# Patient Record
Sex: FEMALE | Race: WHITE | NOT HISPANIC OR LATINO | Employment: OTHER | URBAN - NONMETROPOLITAN AREA
[De-identification: names, ages, dates, MRNs, and addresses within clinical notes are randomized per-mention and may not be internally consistent; named-entity substitution may affect disease eponyms.]

---

## 2017-06-27 ENCOUNTER — OFFICE VISIT (OUTPATIENT)
Dept: OBSTETRICS AND GYNECOLOGY | Facility: CLINIC | Age: 47
End: 2017-06-27

## 2017-06-27 VITALS
WEIGHT: 167 LBS | HEIGHT: 61 IN | DIASTOLIC BLOOD PRESSURE: 74 MMHG | SYSTOLIC BLOOD PRESSURE: 136 MMHG | BODY MASS INDEX: 31.53 KG/M2

## 2017-06-27 DIAGNOSIS — Z01.419 ENCOUNTER FOR GYNECOLOGICAL EXAMINATION WITHOUT ABNORMAL FINDING: Primary | ICD-10-CM

## 2017-06-27 DIAGNOSIS — R68.82 LIBIDO, DECREASED: ICD-10-CM

## 2017-06-27 PROCEDURE — 99396 PREV VISIT EST AGE 40-64: CPT | Performed by: OBSTETRICS & GYNECOLOGY

## 2017-06-27 RX ORDER — FLUOXETINE HYDROCHLORIDE 40 MG/1
80 CAPSULE ORAL DAILY
COMMUNITY
Start: 2017-06-14

## 2017-06-27 RX ORDER — LEVOFLOXACIN 500 MG/1
TABLET, FILM COATED ORAL
COMMUNITY
Start: 2017-06-14 | End: 2017-06-27

## 2017-06-27 RX ORDER — PANTOPRAZOLE SODIUM 40 MG/1
40 TABLET, DELAYED RELEASE ORAL DAILY
COMMUNITY
Start: 2017-06-14

## 2017-06-27 RX ORDER — ATORVASTATIN CALCIUM 40 MG/1
TABLET, FILM COATED ORAL
COMMUNITY
Start: 2017-06-14 | End: 2020-11-11

## 2017-06-27 RX ORDER — BISACODYL 5 MG/1
5 TABLET, DELAYED RELEASE ORAL DAILY PRN
COMMUNITY
End: 2019-11-11

## 2017-06-27 NOTE — PATIENT INSTRUCTIONS
Preventive Care 40-64 Years, Female  Preventive care refers to lifestyle choices and visits with your health care provider that can promote health and wellness.  WHAT DOES PREVENTIVE CARE INCLUDE?  · A yearly physical exam. This is also called an annual well check.  · Dental exams once or twice a year.  · Routine eye exams. Ask your health care provider how often you should have your eyes checked.  · Personal lifestyle choices, including:    Daily care of your teeth and gums.    Regular physical activity.    Eating a healthy diet.    Avoiding tobacco and drug use.    Limiting alcohol use.    Practicing safe sex.    Taking low-dose aspirin daily starting at age 50.    Taking vitamin and mineral supplements as recommended by your health care provider.  WHAT HAPPENS DURING AN ANNUAL WELL CHECK?  The services and screenings done by your health care provider during your annual well check will depend on your age, overall health, lifestyle risk factors, and family history of disease.  Counseling  Your health care provider may ask you questions about your:  · Alcohol use.  · Tobacco use.  · Drug use.  · Emotional well-being.  · Home and relationship well-being.  · Sexual activity.  · Eating habits.  · Work and work environment.  · Method of birth control.  · Menstrual cycle.  · Pregnancy history.  Screening  You may have the following tests or measurements:  · Height, weight, and BMI.  · Blood pressure.  · Lipid and cholesterol levels. These may be checked every 5 years, or more frequently if you are over 50 years old.  · Skin check.  · Lung cancer screening. You may have this screening every year starting at age 55 if you have a 30-pack-year history of smoking and currently smoke or have quit within the past 15 years.  · Fecal occult blood test (FOBT) of the stool. You may have this test every year starting at age 50.  · Flexible sigmoidoscopy or colonoscopy. You may have a sigmoidoscopy every 5 years or a colonoscopy  every 10 years starting at age 50.  · Hepatitis C blood test.  · Hepatitis B blood test.  · Sexually transmitted disease (STD) testing.  · Diabetes screening. This is done by checking your blood sugar (glucose) after you have not eaten for a while (fasting). You may have this done every 1-3 years.  · Mammogram. This may be done every 1-2 years. Talk to your health care provider about when you should start having regular mammograms. This may depend on whether you have a family history of breast cancer.  · BRCA-related cancer screening. This may be done if you have a family history of breast, ovarian, tubal, or peritoneal cancers.  · Pelvic exam and Pap test. This may be done every 3 years starting at age 21. Starting at age 30, this may be done every 5 years if you have a Pap test in combination with an HPV test.  · Bone density scan. This is done to screen for osteoporosis. You may have this scan if you are at high risk for osteoporosis.  Discuss your test results, treatment options, and if necessary, the need for more tests with your health care provider.  Vaccines   Your health care provider may recommend certain vaccines, such as:  · Influenza vaccine. This is recommended every year.  · Tetanus, diphtheria, and acellular pertussis (Tdap, Td) vaccine. You may need a Td booster every 10 years.  · Varicella vaccine. You may need this if you have not been vaccinated.  · Zoster vaccine. You may need this after age 60.  · Measles, mumps, and rubella (MMR) vaccine. You may need at least one dose of MMR if you were born in 1957 or later. You may also need a second dose.  · Pneumococcal 13-valent conjugate (PCV13) vaccine. You may need this if you have certain conditions and were not previously vaccinated.  · Pneumococcal polysaccharide (PPSV23) vaccine. You may need one or two doses if you smoke cigarettes or if you have certain conditions.  · Meningococcal vaccine. You may need this if you have certain  conditions.  · Hepatitis A vaccine. You may need this if you have certain conditions or if you travel or work in places where you may be exposed to hepatitis A.  · Hepatitis B vaccine. You may need this if you have certain conditions or if you travel or work in places where you may be exposed to hepatitis B.  · Haemophilus influenzae type b (Hib) vaccine. You may need this if you have certain conditions.  Talk to your health care provider about which screenings and vaccines you need and how often you need them.     This information is not intended to replace advice given to you by your health care provider. Make sure you discuss any questions you have with your health care provider.     Document Released: 01/13/2017 Document Reviewed: 01/13/2017  Elsevier Interactive Patient Education ©2017 [employer]

## 2017-06-27 NOTE — PROGRESS NOTES
Subjective   Chief Complaint   Patient presents with   • Gynecologic Exam     wants to discuss options for decrease libido.      Lesly Nicholas is a 46 y.o. year old  presenting to be seen for her annual exam. Doing well, quit smoking. Ablatoin  no bleeding since then. MMG 6-8 months ago wNL. S/p BTL. Prozac for many years due to mood labaility    SEXUAL Hx:  She is currently sexually active.  In the past year there has not been new sexual partners.    Condoms are not typically used.  She would not like to be screened for STD's at today's exam.  Current birth control method: not using any form of contraception and does not wish to get pregnant.  MENSTRUAL Hx:  No LMP recorded. Patient has had an ablation.  In the past 6 months her cycles have been regular, predictable and occur monthly.   Her menstrual flow has been absent and flow is normal.   Each month on average there are roughly 0 days of very heavy flow.    Intermenstrual bleeding is absent.    Post-coital bleeding is absent.  Dysmenorrhea: none and is not affecting her activities of daily living  PMS: none and is not affecting her activities of daily living  Her cycles are not a source of concern for her that she wishes to discuss today.  HEALTH Hx:  She exercises regularly: no (and has no plans to become more active).  She wears her seat belt:yes.  She has concerns about domestic violence: no.  OTHER COMPLAINTS:  Nothing else    The following portions of the patient's history were reviewed and updated as appropriate:problem list, current medications, allergies, past family history, past medical history, past social history and past surgical history.    Smoking status: Former Smoker                                                              Packs/day: 0.00      Years: 0.00      Smokeless status: Never Used                        Review of Systems  Consitutional NEG: anorexia or night sweats    POS: nothing reported   Gastointestinal  "NEG: bloating, change in bowel habits, melena or reflux symptoms    POS: nothing reported   Genitourinary NEG: dysuria or hematuria    POS: nothing reported   Integument NEG: moles that are changing in size, shape, color or rashes    POS: nothing reported   Breast NEG: persistent breast lump, skin dimpling or nipple discharge    POS: nothing reported          Objective   /74  Ht 61\" (154.9 cm)  Wt 167 lb (75.8 kg)  BMI 31.55 kg/m2    General:  well developed; well nourished  no acute distress   Skin:  No suspicious lesions seen   Thyroid: normal to inspection and palpation   Breasts:  Examined in supine position  Symmetric without masses or skin dimpling  Nipples normal without inversion, lesions or discharge  There are no palpable axillary nodes   Abdomen: soft, non-tender; no masses  no umbilical or inginual hernias are present  no hepato-splenomegaly   Pelvis: Clinical staff was present for exam  External genitalia:  normal appearance of the external genitalia including Bartholin's and Anahola's glands.  :  urethral meatus normal; urethral hypermobility is absent.  Vaginal:  normal pink mucosa without prolapse or lesions.  Cervix:  normal appearance.  Uterus:  normal size, shape and consistency.  Adnexa:  normal bimanual exam of the adnexa.        Assessment   1. Normal PE  2. Decerase Libido--Test gel didn't work, takes prozac 80mg po qday     Plan   1. PAP done, rec Natural prgeterone cream.   2. Recdecreasing  Prozac  3.          This note was electronically signed.      June 27, 2017    "

## 2017-06-28 LAB
ESTRADIOL SERPL-MCNC: <5 PG/ML
FSH SERPL-ACNC: 23.5 MIU/ML

## 2017-07-06 DIAGNOSIS — Z01.419 ENCOUNTER FOR GYNECOLOGICAL EXAMINATION WITHOUT ABNORMAL FINDING: ICD-10-CM

## 2018-07-25 ENCOUNTER — APPOINTMENT (OUTPATIENT)
Dept: GENERAL RADIOLOGY | Facility: HOSPITAL | Age: 48
End: 2018-07-25

## 2018-07-25 ENCOUNTER — HOSPITAL ENCOUNTER (EMERGENCY)
Facility: HOSPITAL | Age: 48
Discharge: HOME | End: 2018-07-25
Attending: EMERGENCY MEDICINE | Admitting: EMERGENCY MEDICINE

## 2018-07-25 VITALS
DIASTOLIC BLOOD PRESSURE: 91 MMHG | HEART RATE: 73 BPM | HEIGHT: 61 IN | BODY MASS INDEX: 30.89 KG/M2 | RESPIRATION RATE: 18 BRPM | WEIGHT: 163.6 LBS | TEMPERATURE: 98.1 F | SYSTOLIC BLOOD PRESSURE: 135 MMHG | OXYGEN SATURATION: 97 %

## 2018-07-25 DIAGNOSIS — R00.2 HEART PALPITATIONS: Primary | ICD-10-CM

## 2018-07-25 LAB
ALBUMIN SERPL-MCNC: 4.1 G/DL (ref 3.5–5)
ALBUMIN/GLOB SERPL: 1.5 G/DL (ref 1–2)
ALP SERPL-CCNC: 41 U/L (ref 38–126)
ALT SERPL W P-5'-P-CCNC: 31 U/L (ref 13–69)
AMPHET+METHAMPHET UR QL: NEGATIVE
AMPHETAMINES UR QL: NEGATIVE
ANION GAP SERPL CALCULATED.3IONS-SCNC: 10.3 MMOL/L (ref 10–20)
AST SERPL-CCNC: 25 U/L (ref 15–46)
BARBITURATES UR QL SCN: NEGATIVE
BASOPHILS # BLD AUTO: 0.03 10*3/MM3 (ref 0–0.2)
BASOPHILS NFR BLD AUTO: 0.6 % (ref 0–2.5)
BENZODIAZ UR QL SCN: NEGATIVE
BILIRUB SERPL-MCNC: 0.2 MG/DL (ref 0.2–1.3)
BUN BLD-MCNC: 9 MG/DL (ref 7–20)
BUN/CREAT SERPL: 12.9 (ref 7.1–23.5)
BUPRENORPHINE SERPL-MCNC: NEGATIVE NG/ML
CALCIUM SPEC-SCNC: 9.4 MG/DL (ref 8.4–10.2)
CANNABINOIDS SERPL QL: NEGATIVE
CHLORIDE SERPL-SCNC: 105 MMOL/L (ref 98–107)
CO2 SERPL-SCNC: 29 MMOL/L (ref 26–30)
COCAINE UR QL: NEGATIVE
CREAT BLD-MCNC: 0.7 MG/DL (ref 0.6–1.3)
DEPRECATED RDW RBC AUTO: 45.2 FL (ref 37–54)
EOSINOPHIL # BLD AUTO: 0.12 10*3/MM3 (ref 0–0.7)
EOSINOPHIL NFR BLD AUTO: 2.6 % (ref 0–7)
ERYTHROCYTE [DISTWIDTH] IN BLOOD BY AUTOMATED COUNT: 13 % (ref 11.5–14.5)
GFR SERPL CREATININE-BSD FRML MDRD: 89 ML/MIN/1.73
GLOBULIN UR ELPH-MCNC: 2.7 GM/DL
GLUCOSE BLD-MCNC: 95 MG/DL (ref 74–98)
HCT VFR BLD AUTO: 34 % (ref 37–47)
HGB BLD-MCNC: 11.4 G/DL (ref 12–16)
IMM GRANULOCYTES # BLD: 0.01 10*3/MM3 (ref 0–0.06)
IMM GRANULOCYTES NFR BLD: 0.2 % (ref 0–0.6)
LYMPHOCYTES # BLD AUTO: 1.27 10*3/MM3 (ref 0.6–3.4)
LYMPHOCYTES NFR BLD AUTO: 27.3 % (ref 10–50)
MCH RBC QN AUTO: 31.7 PG (ref 27–31)
MCHC RBC AUTO-ENTMCNC: 33.5 G/DL (ref 30–37)
MCV RBC AUTO: 94.4 FL (ref 81–99)
METHADONE UR QL SCN: NEGATIVE
MONOCYTES # BLD AUTO: 0.37 10*3/MM3 (ref 0–0.9)
MONOCYTES NFR BLD AUTO: 8 % (ref 0–12)
NEUTROPHILS # BLD AUTO: 2.85 10*3/MM3 (ref 2–6.9)
NEUTROPHILS NFR BLD AUTO: 61.3 % (ref 37–80)
NRBC BLD MANUAL-RTO: 0 /100 WBC (ref 0–0)
OPIATES UR QL: NEGATIVE
OXYCODONE UR QL SCN: NEGATIVE
PCP UR QL SCN: NEGATIVE
PLATELET # BLD AUTO: 219 10*3/MM3 (ref 130–400)
PMV BLD AUTO: 9 FL (ref 6–12)
POTASSIUM BLD-SCNC: 4.3 MMOL/L (ref 3.5–5.1)
PROPOXYPH UR QL: NEGATIVE
PROT SERPL-MCNC: 6.8 G/DL (ref 6.3–8.2)
RBC # BLD AUTO: 3.6 10*6/MM3 (ref 4.2–5.4)
SODIUM BLD-SCNC: 140 MMOL/L (ref 137–145)
TRICYCLICS UR QL SCN: NEGATIVE
TROPONIN I SERPL-MCNC: <0.012 NG/ML (ref 0–0.03)
WBC NRBC COR # BLD: 4.65 10*3/MM3 (ref 4.8–10.8)

## 2018-07-25 PROCEDURE — 80053 COMPREHEN METABOLIC PANEL: CPT | Performed by: PHYSICIAN ASSISTANT

## 2018-07-25 PROCEDURE — 84484 ASSAY OF TROPONIN QUANT: CPT | Performed by: PHYSICIAN ASSISTANT

## 2018-07-25 PROCEDURE — 99283 EMERGENCY DEPT VISIT LOW MDM: CPT

## 2018-07-25 PROCEDURE — 93005 ELECTROCARDIOGRAM TRACING: CPT | Performed by: EMERGENCY MEDICINE

## 2018-07-25 PROCEDURE — 80306 DRUG TEST PRSMV INSTRMNT: CPT | Performed by: PHYSICIAN ASSISTANT

## 2018-07-25 PROCEDURE — 85025 COMPLETE CBC W/AUTO DIFF WBC: CPT | Performed by: PHYSICIAN ASSISTANT

## 2018-07-25 PROCEDURE — 71045 X-RAY EXAM CHEST 1 VIEW: CPT

## 2018-07-25 RX ORDER — HYDROCODONE BITARTRATE AND ACETAMINOPHEN 5; 325 MG/1; MG/1
1 TABLET ORAL ONCE
Status: DISCONTINUED | OUTPATIENT
Start: 2018-07-25 | End: 2018-07-25

## 2018-07-25 RX ORDER — MELOXICAM 15 MG/1
15 TABLET ORAL DAILY
COMMUNITY
End: 2021-05-05

## 2018-10-09 ENCOUNTER — CONSULT (OUTPATIENT)
Dept: CARDIOLOGY | Facility: CLINIC | Age: 48
End: 2018-10-09

## 2018-10-09 VITALS
HEIGHT: 61 IN | OXYGEN SATURATION: 98 % | BODY MASS INDEX: 30.4 KG/M2 | HEART RATE: 91 BPM | SYSTOLIC BLOOD PRESSURE: 116 MMHG | DIASTOLIC BLOOD PRESSURE: 76 MMHG | WEIGHT: 161 LBS

## 2018-10-09 DIAGNOSIS — R00.2 PALPITATIONS: Primary | ICD-10-CM

## 2018-10-09 PROCEDURE — 99204 OFFICE O/P NEW MOD 45 MIN: CPT | Performed by: INTERNAL MEDICINE

## 2018-10-09 RX ORDER — VALACYCLOVIR HYDROCHLORIDE 1 G/1
TABLET, FILM COATED ORAL
COMMUNITY
End: 2021-05-05

## 2018-10-09 RX ORDER — LISINOPRIL 10 MG/1
TABLET ORAL
COMMUNITY
Start: 2018-10-04 | End: 2020-11-11

## 2018-10-09 NOTE — PROGRESS NOTES
Subjective:     Encounter Date:10/09/2018      Patient ID: Lesly Nicholas is a 47 y.o. female.    Chief Complaint: Palpitations  HPI  This is a 47-year-old female patient who presents to cardiology clinic with a one year history of progressively worsening palpitations.  The patient indicates that she has been having daily palpitations that occur several times per day but generally last only one to 2 seconds.  This is described as a sense that her heart is beating irregularly.  There is no associated dizziness or syncope.  She cannot identify any precipitating aggravating or alleviating features.  She has a history of hypertension and elevated cholesterol but no history of diabetes.  She has no history of documented arrhythmia and has never had outpatient cardiac monitoring.  She has had a 12-lead electrocardiogram which was normal.  She has no personal history of congestive heart failure or cardiomyopathy.  Her family history is strongly positive for premature coronary disease with her father having a heart attack at the age of 36.  She has never had an ischemic evaluation.  She reports being moderately active and has no exertional symptoms.  There is no shortness of breath at rest or with activity.  There is no chest discomfort at rest or with activity.  There is no exertional chest arm neck jaw shoulder or back discomfort.  There is no orthopnea PND or lower extremity edema.  She stopped smoking 8 years ago and has never restarted.  The following portions of the patient's history were reviewed and updated as appropriate: allergies, current medications, past family history, past medical history, past social history, past surgical history and problem  Review of Systems   Constitution: Negative for chills, diaphoresis, fever, weakness, malaise/fatigue, weight gain and weight loss.   HENT: Negative for ear discharge, hearing loss, hoarse voice and nosebleeds.    Eyes: Negative for discharge, double  vision, pain and photophobia.   Cardiovascular: Positive for palpitations. Negative for chest pain, claudication, cyanosis, dyspnea on exertion, irregular heartbeat, leg swelling, near-syncope, orthopnea, paroxysmal nocturnal dyspnea and syncope.   Respiratory: Negative for cough, hemoptysis, shortness of breath, sputum production and wheezing.    Endocrine: Negative for cold intolerance, heat intolerance, polydipsia, polyphagia and polyuria.   Hematologic/Lymphatic: Negative for adenopathy and bleeding problem. Does not bruise/bleed easily.   Skin: Negative for color change, flushing, itching and rash.   Musculoskeletal: Negative for muscle cramps, muscle weakness, myalgias and stiffness.   Gastrointestinal: Negative for abdominal pain, diarrhea, hematemesis, hematochezia, nausea and vomiting.   Genitourinary: Negative for dysuria, frequency and nocturia.   Neurological: Negative for focal weakness, loss of balance, numbness, paresthesias and seizures.   Psychiatric/Behavioral: Negative for altered mental status, hallucinations and suicidal ideas.   Allergic/Immunologic: Negative for HIV exposure, hives and persistent infections.           Current Outpatient Prescriptions:   •  atorvastatin (LIPITOR) 40 MG tablet, , Disp: , Rfl:   •  bisacodyl (DULCOLAX) 5 MG EC tablet, Take 5 mg by mouth Daily As Needed for Constipation., Disp: , Rfl:   •  FLUoxetine (PROzac) 40 MG capsule, , Disp: , Rfl:   •  lisinopril (PRINIVIL,ZESTRIL) 10 MG tablet, , Disp: , Rfl:   •  lysine acetate 500 MG tablet tablet, Take  by mouth Daily., Disp: , Rfl:   •  meloxicam (MOBIC) 15 MG tablet, Take 15 mg by mouth Daily., Disp: , Rfl:   •  pantoprazole (PROTONIX) 40 MG EC tablet, , Disp: , Rfl:   •  Potassium Gluconate (SM POTASSIUM PO), Take  by mouth., Disp: , Rfl:   •  valACYclovir (VALTREX) 1000 MG tablet, valacyclovir 1 gram tablet, Disp: , Rfl:      Objective:     Physical Exam   Constitutional: She is oriented to person, place, and time.  "She appears well-developed and well-nourished. No distress.   HENT:   Head: Normocephalic and atraumatic.   Mouth/Throat: Oropharynx is clear and moist.   Eyes: Pupils are equal, round, and reactive to light. Conjunctivae and EOM are normal. No scleral icterus.   Neck: Normal range of motion. Neck supple. No JVD present. No tracheal deviation present. No thyromegaly present.   Cardiovascular: Normal rate, regular rhythm, S1 normal, S2 normal, normal heart sounds, intact distal pulses and normal pulses.  PMI is not displaced.  Exam reveals no gallop and no friction rub.    No murmur heard.  Pulmonary/Chest: Effort normal and breath sounds normal. No respiratory distress. She has no wheezes. She has no rales.   Abdominal: Soft. Bowel sounds are normal. She exhibits no distension and no mass. There is no tenderness. There is no rebound and no guarding.   Musculoskeletal: Normal range of motion. She exhibits no edema or deformity.   Neurological: She is alert and oriented to person, place, and time. She displays normal reflexes. No cranial nerve deficit. Coordination normal.   Skin: Skin is warm and dry. No rash noted. She is not diaphoretic. No erythema.   Psychiatric: She has a normal mood and affect. Her behavior is normal. Thought content normal.     Blood pressure 116/76, pulse 91, height 154.9 cm (60.98\"), weight 73 kg (161 lb), SpO2 98 %.   Lab Review:       Assessment:         1. Palpitations  Some of the patient's symptoms could be due to underlying arrhythmia and/or ectopy.  She has never worn an outpatient heart monitor.  - Adult Transthoracic Echo Complete W/ Cont if Necessary Per Protocol  - Treadmill Stress Test  - Holter Monitor - 72 Hour Up To 21 Days  Procedures     Plan:       I have recommended a treadmill exercise stress test as well as an echocardiogram.  I have recommended an outpatient Holter monitor.  No changes in her medication therapy have been made at today's visit.  Further recommendations " will be predicated on the results of her outpatient testing.  The importance of diet exercise and weight management have been discussed with the patient.  The patient has been congratulated on her smoking cessation and cautioned to never again resumed cigarette smoking.

## 2018-10-18 LAB
BH CV STRESS BP STAGE 1: NORMAL
BH CV STRESS BP STAGE 2: NORMAL
BH CV STRESS BP STAGE 3: NORMAL
BH CV STRESS DURATION MIN STAGE 1: 3
BH CV STRESS DURATION MIN STAGE 2: 3
BH CV STRESS DURATION MIN STAGE 3: 3
BH CV STRESS DURATION MIN STAGE 4: 3
BH CV STRESS DURATION SEC STAGE 1: 0
BH CV STRESS DURATION SEC STAGE 2: 0
BH CV STRESS DURATION SEC STAGE 3: 0
BH CV STRESS DURATION SEC STAGE 4: 0
BH CV STRESS GRADE STAGE 1: 10
BH CV STRESS GRADE STAGE 2: 12
BH CV STRESS GRADE STAGE 3: 14
BH CV STRESS GRADE STAGE 4: 16
BH CV STRESS HR STAGE 1: 112
BH CV STRESS HR STAGE 2: 129
BH CV STRESS HR STAGE 3: 139
BH CV STRESS HR STAGE 4: 151
BH CV STRESS METS STAGE 1: 5
BH CV STRESS METS STAGE 2: 7.5
BH CV STRESS METS STAGE 3: 10
BH CV STRESS METS STAGE 4: 13.5
BH CV STRESS O2 STAGE 1: 97
BH CV STRESS O2 STAGE 2: 97
BH CV STRESS O2 STAGE 3: 97
BH CV STRESS O2 STAGE 4: 97
BH CV STRESS PROTOCOL 1: NORMAL
BH CV STRESS RECOVERY BP: NORMAL MMHG
BH CV STRESS RECOVERY HR: 96 BPM
BH CV STRESS RECOVERY O2: 99 %
BH CV STRESS SPEED STAGE 1: 1.7
BH CV STRESS SPEED STAGE 2: 2.5
BH CV STRESS SPEED STAGE 3: 3.4
BH CV STRESS SPEED STAGE 4: 4.2
BH CV STRESS STAGE 1: 1
BH CV STRESS STAGE 2: 2
BH CV STRESS STAGE 3: 3
BH CV STRESS STAGE 4: 4
MAXIMAL PREDICTED HEART RATE: 172 BPM
PERCENT MAX PREDICTED HR: 87.79 %
STRESS BASELINE BP: NORMAL MMHG
STRESS BASELINE HR: 89 BPM
STRESS O2 SAT REST: 99 %
STRESS PERCENT HR: 103 %
STRESS POST ESTIMATED WORKLOAD: 10.1 METS
STRESS POST EXERCISE DUR MIN: 9 MIN
STRESS POST EXERCISE DUR SEC: 30 SEC
STRESS POST O2 SAT PEAK: 97 %
STRESS POST PEAK BP: NORMAL MMHG
STRESS POST PEAK HR: 151 BPM
STRESS TARGET HR: 146 BPM

## 2018-10-19 LAB
BH CV ECHO MEAS - % IVS THICK: 65 %
BH CV ECHO MEAS - % LVPW THICK: 58.3 %
BH CV ECHO MEAS - AO MAX PG (FULL): 1.6 MMHG
BH CV ECHO MEAS - AO MAX PG: 6 MMHG
BH CV ECHO MEAS - AO MEAN PG (FULL): 1 MMHG
BH CV ECHO MEAS - AO MEAN PG: 3 MMHG
BH CV ECHO MEAS - AO ROOT AREA: 3.8 CM^2
BH CV ECHO MEAS - AO ROOT DIAM: 2.2 CM
BH CV ECHO MEAS - AO V2 MAX: 127 CM/SEC
BH CV ECHO MEAS - AO V2 MEAN: 79.6 CM/SEC
BH CV ECHO MEAS - AO V2 VTI: 21.6 CM
BH CV ECHO MEAS - AVA(I,A): 2.5 CM^2
BH CV ECHO MEAS - AVA(I,D): 2.5 CM^2
BH CV ECHO MEAS - AVA(V,A): 2.3 CM^2
BH CV ECHO MEAS - AVA(V,D): 2.3 CM^2
BH CV ECHO MEAS - EDV(CUBED): 132.7 ML
BH CV ECHO MEAS - EDV(MOD-SP4): 94 ML
BH CV ECHO MEAS - EDV(TEICH): 123.8 ML
BH CV ECHO MEAS - EF(CUBED): 69 %
BH CV ECHO MEAS - EF(MOD-SP4): 59.6 %
BH CV ECHO MEAS - EF(TEICH): 60.3 %
BH CV ECHO MEAS - ESV(CUBED): 41.1 ML
BH CV ECHO MEAS - ESV(MOD-SP4): 38 ML
BH CV ECHO MEAS - ESV(TEICH): 49.1 ML
BH CV ECHO MEAS - FS: 32.4 %
BH CV ECHO MEAS - IVS/LVPW: 1.7
BH CV ECHO MEAS - IVSD: 1 CM
BH CV ECHO MEAS - IVSS: 1.7 CM
BH CV ECHO MEAS - LA DIMENSION: 3.2 CM
BH CV ECHO MEAS - LA/AO: 1.5
BH CV ECHO MEAS - LAD MAJOR: 4.6 CM
BH CV ECHO MEAS - LAT PEAK E' VEL: 8.6 CM/SEC
BH CV ECHO MEAS - LATERAL E/E' RATIO: 7.1
BH CV ECHO MEAS - LV IVRT: 0.12 SEC
BH CV ECHO MEAS - LV MASS(C)D: 140.5 GRAMS
BH CV ECHO MEAS - LV MASS(C)S: 150.7 GRAMS
BH CV ECHO MEAS - LV MAX PG: 4.4 MMHG
BH CV ECHO MEAS - LV MEAN PG: 2 MMHG
BH CV ECHO MEAS - LV V1 MAX: 105 CM/SEC
BH CV ECHO MEAS - LV V1 MEAN: 56.8 CM/SEC
BH CV ECHO MEAS - LV V1 VTI: 19.1 CM
BH CV ECHO MEAS - LVIDD: 5.1 CM
BH CV ECHO MEAS - LVIDS: 3.5 CM
BH CV ECHO MEAS - LVLD AP4: 7.2 CM
BH CV ECHO MEAS - LVLS AP4: 6.8 CM
BH CV ECHO MEAS - LVOT AREA (M): 2.8 CM^2
BH CV ECHO MEAS - LVOT AREA: 2.8 CM^2
BH CV ECHO MEAS - LVOT DIAM: 1.9 CM
BH CV ECHO MEAS - LVPWD: 0.6 CM
BH CV ECHO MEAS - LVPWS: 0.95 CM
BH CV ECHO MEAS - MED PEAK E' VEL: 6.1 CM/SEC
BH CV ECHO MEAS - MEDIAL E/E' RATIO: 10
BH CV ECHO MEAS - MV A MAX VEL: 63.2 CM/SEC
BH CV ECHO MEAS - MV DEC SLOPE: 353 CM/SEC^2
BH CV ECHO MEAS - MV DEC TIME: 0.19 SEC
BH CV ECHO MEAS - MV E MAX VEL: 61.2 CM/SEC
BH CV ECHO MEAS - MV E/A: 0.97
BH CV ECHO MEAS - MV MAX PG: 2.3 MMHG
BH CV ECHO MEAS - MV MEAN PG: 1 MMHG
BH CV ECHO MEAS - MV P1/2T MAX VEL: 62.2 CM/SEC
BH CV ECHO MEAS - MV P1/2T: 51.6 MSEC
BH CV ECHO MEAS - MV V2 MAX: 75.8 CM/SEC
BH CV ECHO MEAS - MV V2 MEAN: 44.3 CM/SEC
BH CV ECHO MEAS - MV V2 VTI: 19.6 CM
BH CV ECHO MEAS - MVA P1/2T LCG: 3.5 CM^2
BH CV ECHO MEAS - MVA(P1/2T): 4.3 CM^2
BH CV ECHO MEAS - MVA(VTI): 2.8 CM^2
BH CV ECHO MEAS - PA MAX PG: 1.4 MMHG
BH CV ECHO MEAS - PA V2 MAX: 60.2 CM/SEC
BH CV ECHO MEAS - RAP SYSTOLE: 10 MMHG
BH CV ECHO MEAS - RVSP: 36 MMHG
BH CV ECHO MEAS - SV(AO): 82.1 ML
BH CV ECHO MEAS - SV(CUBED): 91.6 ML
BH CV ECHO MEAS - SV(LVOT): 54.2 ML
BH CV ECHO MEAS - SV(MOD-SP4): 56 ML
BH CV ECHO MEAS - SV(TEICH): 74.7 ML
BH CV ECHO MEAS - TR MAX PG: 26 MMHG
BH CV ECHO MEAS - TR MAX VEL: 255 CM/SEC
BH CV ECHO MEAS - TV MAX PG: 0.94 MMHG
BH CV ECHO MEAS - TV V2 MAX: 48.4 CM/SEC
BH CV ECHO MEASUREMENTS AVERAGE E/E' RATIO: 8.33
LV EF 2D ECHO EST: 60 %

## 2018-11-12 ENCOUNTER — OFFICE VISIT (OUTPATIENT)
Dept: CARDIOLOGY | Facility: CLINIC | Age: 48
End: 2018-11-12

## 2018-11-12 VITALS
OXYGEN SATURATION: 100 % | HEART RATE: 103 BPM | SYSTOLIC BLOOD PRESSURE: 132 MMHG | WEIGHT: 162 LBS | BODY MASS INDEX: 31.8 KG/M2 | RESPIRATION RATE: 18 BRPM | HEIGHT: 60 IN | DIASTOLIC BLOOD PRESSURE: 82 MMHG

## 2018-11-12 DIAGNOSIS — R00.2 PALPITATIONS: Primary | ICD-10-CM

## 2018-11-12 PROCEDURE — 99214 OFFICE O/P EST MOD 30 MIN: CPT | Performed by: INTERNAL MEDICINE

## 2018-11-12 NOTE — PROGRESS NOTES
Subjective:     Encounter Date:11/12/2018      Patient ID: Lesly Nicholas is a 47 y.o. female.    Chief Complaint: Palpitations  HPI  This is a 47-year-old female patient who presents to cardiology clinic for follow-up after having outpatient testing.  The patient experienced palpitations which she describes as a fluttering in a sense that her heart has skipped beats.  She has had one severe episode since turning in her cardiac monitor.  The patient had 27 entries to her diary complaining of palpitations.  There was inconsistent correlation between palpitations and PACs\PVCs.  The patient had a total of 10 isolated PACs over 14 monitoring cycle.  There was a total of 4 isolated PVCs over the same time.  She has had no dizziness or syncope.  The patient has no chest discomfort at rest or with activity.  There is no exertional chest arm neck jaw shoulder or back discomfort.  There is no orthopnea PND or lower extremity edema.  She stopped smoking 7 years ago.  The patient also underwent a treadmill exercise stress test which was clinically and electrocardiographically negative for ischemia.  She exercise to target heart rate without chest pain, shortness of breath or ischemic EKG changes.  Her heart rate and blood pressure response to exercise was normal and there was no exercise-induced arrhythmia.  The patient also underwent an echocardiogram which was normal. The echocardiogram showed no evidence of ventricular hypertrophy or cardiac chamber enlargement.  Left ventricular systolic and diastolic function was normal.  There was no evidence of valvular pathology of significance, pericardial disease or intracardiac shunting.  The left ventricular ejection fraction was normal and there were no regional wall motion abnormalities.  Pulmonary artery systolic pressures were estimated to be on the upper limits of normal consistent with her history of prior heavy tobacco abuse.    The following portions of the  patient's history were reviewed and updated as appropriate: allergies, current medications, past family history, past medical history, past social history, past surgical history and problem  Review of Systems   Constitution: Negative for chills, diaphoresis, fever, weakness, malaise/fatigue, weight gain and weight loss.   HENT: Negative for ear discharge, hearing loss, hoarse voice and nosebleeds.    Eyes: Negative for discharge, double vision, pain and photophobia.   Cardiovascular: Positive for palpitations. Negative for chest pain, claudication, cyanosis, dyspnea on exertion, irregular heartbeat, leg swelling, near-syncope, orthopnea, paroxysmal nocturnal dyspnea and syncope.   Respiratory: Negative for cough, hemoptysis, shortness of breath, sputum production and wheezing.    Endocrine: Negative for cold intolerance, heat intolerance, polydipsia, polyphagia and polyuria.   Hematologic/Lymphatic: Negative for adenopathy and bleeding problem. Does not bruise/bleed easily.   Skin: Negative for color change, flushing, itching and rash.   Musculoskeletal: Negative for muscle cramps, muscle weakness, myalgias and stiffness.   Gastrointestinal: Negative for abdominal pain, diarrhea, hematemesis, hematochezia, nausea and vomiting.   Genitourinary: Negative for dysuria, frequency and nocturia.   Neurological: Negative for focal weakness, loss of balance, numbness, paresthesias and seizures.   Psychiatric/Behavioral: Negative for altered mental status, hallucinations and suicidal ideas.   Allergic/Immunologic: Negative for HIV exposure, hives and persistent infections.           Current Outpatient Medications:   •  atorvastatin (LIPITOR) 40 MG tablet, , Disp: , Rfl:   •  bisacodyl (DULCOLAX) 5 MG EC tablet, Take 5 mg by mouth Daily As Needed for Constipation., Disp: , Rfl:   •  FLUoxetine (PROzac) 40 MG capsule, , Disp: , Rfl:   •  lisinopril (PRINIVIL,ZESTRIL) 10 MG tablet, , Disp: , Rfl:   •  lysine acetate 500 MG  "tablet tablet, Take  by mouth Daily., Disp: , Rfl:   •  meloxicam (MOBIC) 15 MG tablet, Take 15 mg by mouth Daily., Disp: , Rfl:   •  pantoprazole (PROTONIX) 40 MG EC tablet, , Disp: , Rfl:   •  Potassium Gluconate (SM POTASSIUM PO), Take  by mouth., Disp: , Rfl:   •  valACYclovir (VALTREX) 1000 MG tablet, valacyclovir 1 gram tablet, Disp: , Rfl:      Objective:     Physical Exam   Constitutional: She is oriented to person, place, and time. She appears well-developed and well-nourished. No distress.   HENT:   Head: Normocephalic and atraumatic.   Mouth/Throat: Oropharynx is clear and moist.   Eyes: Conjunctivae and EOM are normal. Pupils are equal, round, and reactive to light. No scleral icterus.   Neck: Normal range of motion. Neck supple. No JVD present. No tracheal deviation present. No thyromegaly present.   Cardiovascular: Normal rate, regular rhythm, S1 normal, S2 normal, normal heart sounds, intact distal pulses and normal pulses. PMI is not displaced. Exam reveals no gallop and no friction rub.   No murmur heard.  Pulmonary/Chest: Effort normal and breath sounds normal. No respiratory distress. She has no wheezes. She has no rales.   Abdominal: Soft. Bowel sounds are normal. She exhibits no distension and no mass. There is no tenderness. There is no rebound and no guarding.   Musculoskeletal: Normal range of motion. She exhibits no edema or deformity.   Neurological: She is alert and oriented to person, place, and time. She displays normal reflexes. No cranial nerve deficit. Coordination normal.   Skin: Skin is warm and dry. No rash noted. She is not diaphoretic. No erythema.   Psychiatric: She has a normal mood and affect. Her behavior is normal. Thought content normal.     Blood pressure 132/82, pulse 103, resp. rate 18, height 152.4 cm (60\"), weight 73.5 kg (162 lb), SpO2 100 %.   Lab Review:       Assessment:         1. Palpitations  The patient is experiencing symptomatic PACs.  No evidence of frequent " or complex atrial or ventricular ectopy.  No evidence of arrhythmia.    Procedures     Plan:       No changes in her medication therapy have been made at today's visit.  The pathophysiology of PACs and PVCs have been discussed with the patient.  The patient has been educated as to the benign nature of ectopy.  She has been reassured that there is no increased risk of heart attack, stroke, premature death or underlying structural heart disease.  She has been reassured that antiarrhythmic drug therapy is not indicated.  If her symptoms worsen we will consider treating with either beta blocker therapy or calcium channel blocker therapy.  She has been counseled regarding the importance of eliminating cardiac stimulants particularly caffeine.  Fortunately she no longer smokes.  She has been congratulated on her smoking cessation and cautioned to never again resumed cigarette smoking.  The importance of diet exercise and weight management have been reinforced to the patient.  The patient has been counseled to maintain an active lifestyle.  25 minutes have been spent with the patient the entire time dedicated to face-to-face counseling and education.

## 2019-05-02 ENCOUNTER — TRANSCRIBE ORDERS (OUTPATIENT)
Dept: ADMINISTRATIVE | Facility: HOSPITAL | Age: 49
End: 2019-05-02

## 2019-05-02 DIAGNOSIS — Z12.39 SCREENING BREAST EXAMINATION: Primary | ICD-10-CM

## 2019-06-18 ENCOUNTER — HOSPITAL ENCOUNTER (OUTPATIENT)
Dept: MAMMOGRAPHY | Facility: HOSPITAL | Age: 49
Discharge: HOME | End: 2019-06-18
Admitting: NURSE PRACTITIONER

## 2019-06-18 DIAGNOSIS — Z12.39 SCREENING BREAST EXAMINATION: ICD-10-CM

## 2019-06-18 PROCEDURE — 77063 BREAST TOMOSYNTHESIS BI: CPT

## 2019-06-18 PROCEDURE — 77067 SCR MAMMO BI INCL CAD: CPT

## 2019-11-11 ENCOUNTER — OFFICE VISIT (OUTPATIENT)
Dept: CARDIOLOGY | Facility: CLINIC | Age: 49
End: 2019-11-11

## 2019-11-11 VITALS
WEIGHT: 163 LBS | OXYGEN SATURATION: 98 % | HEART RATE: 88 BPM | SYSTOLIC BLOOD PRESSURE: 118 MMHG | BODY MASS INDEX: 32 KG/M2 | DIASTOLIC BLOOD PRESSURE: 90 MMHG | HEIGHT: 60 IN

## 2019-11-11 DIAGNOSIS — K21.9 CHEST PAIN DUE TO GERD: ICD-10-CM

## 2019-11-11 DIAGNOSIS — R00.2 PALPITATIONS: Primary | ICD-10-CM

## 2019-11-11 DIAGNOSIS — E78.00 PURE HYPERCHOLESTEROLEMIA: ICD-10-CM

## 2019-11-11 DIAGNOSIS — I10 ESSENTIAL HYPERTENSION: ICD-10-CM

## 2019-11-11 DIAGNOSIS — R07.9 CHEST PAIN DUE TO GERD: ICD-10-CM

## 2019-11-11 PROCEDURE — 99214 OFFICE O/P EST MOD 30 MIN: CPT | Performed by: NURSE PRACTITIONER

## 2019-11-11 RX ORDER — ATORVASTATIN CALCIUM 20 MG/1
TABLET, FILM COATED ORAL
COMMUNITY
Start: 2019-10-30 | End: 2020-11-11

## 2019-11-11 RX ORDER — METOPROLOL SUCCINATE 25 MG/1
25 TABLET, EXTENDED RELEASE ORAL NIGHTLY
Qty: 30 TABLET | Refills: 11 | Status: SHIPPED | OUTPATIENT
Start: 2019-11-11 | End: 2022-10-25

## 2019-11-11 RX ORDER — OMEPRAZOLE 40 MG/1
CAPSULE, DELAYED RELEASE ORAL
COMMUNITY
End: 2020-11-11

## 2019-11-11 NOTE — PROGRESS NOTES
Lesly Nicholas is a 48 y.o. female.  MRN #: [id number]    Referring Provider: Maxim Brandt MD    Chief Complaint:   Chief Complaint   Patient presents with   • Follow-up   • Palpitations        History of Present Illness:  48-year-old female patient presents for her one-year cardiac follow-up/evaluation for palpitations and essential hypertension.  Patient reports an uneventful year regarding palpitations, denies any chest pain, pressure, palpitations at rest or with exertion.  Denies any shortness of breath or orthopnea.  Denies any weight increase or fluid retention.  She states her blood pressure is at times sporadic and elevated.  She is presently on lisinopril 10 mg daily.  She is on atorvastatin for elevated cholesterol which is managed by her primary care provider.    The patient presents today with their self who contributes to the history of their care.     The following portions of the patient's history were reviewed and updated as appropriate: allergies, current medications, past family history, past medical history, past social history, past surgical history and problem list.     Review of Systems:     Review of Systems   Constitutional: Negative.  Negative for activity change, appetite change, diaphoresis, fatigue, unexpected weight gain and unexpected weight loss.   HENT: Negative.    Eyes: Negative.  Negative for blurred vision, double vision, photophobia and visual disturbance.   Respiratory: Negative.  Negative for apnea, cough, chest tightness, shortness of breath and wheezing.    Cardiovascular: Positive for palpitations. Negative for chest pain and leg swelling.        History of hypertension.  History of hypercholesterolemia   Gastrointestinal: Negative.  Negative for abdominal distention, abdominal pain, blood in stool, nausea, vomiting, GERD and indigestion.   Endocrine: Negative.  Negative for cold intolerance, heat intolerance, polydipsia, polyphagia and polyuria.   Genitourinary:  "Negative for decreased libido, frequency, genital sores, hematuria, urgency and urinary incontinence.   Musculoskeletal: Negative.  Negative for back pain, joint swelling, myalgias, neck pain and neck stiffness.   Skin: Negative.  Negative for color change, pallor, rash and bruise.   Allergic/Immunologic: Negative.    Neurological: Negative.  Negative for dizziness, tremors, seizures, syncope, facial asymmetry, speech difficulty, weakness, light-headedness and numbness.   Hematological: Negative for adenopathy. Does not bruise/bleed easily.   Psychiatric/Behavioral: Negative for agitation, decreased concentration, sleep disturbance, suicidal ideas and stress. The patient is not nervous/anxious.    All other systems reviewed and are negative.         Current Outpatient Medications:   •  atorvastatin (LIPITOR) 20 MG tablet, , Disp: , Rfl:   •  atorvastatin (LIPITOR) 40 MG tablet, , Disp: , Rfl:   •  FLUoxetine (PROzac) 40 MG capsule, , Disp: , Rfl:   •  lisinopril (PRINIVIL,ZESTRIL) 10 MG tablet, , Disp: , Rfl:   •  meloxicam (MOBIC) 15 MG tablet, Take 15 mg by mouth Daily., Disp: , Rfl:   •  omeprazole (priLOSEC) 40 MG capsule, omeprazole 40 mg capsule,delayed release, Disp: , Rfl:   •  pantoprazole (PROTONIX) 40 MG EC tablet, , Disp: , Rfl:   •  Potassium Gluconate (SM POTASSIUM PO), Take  by mouth., Disp: , Rfl:   •  valACYclovir (VALTREX) 1000 MG tablet, valacyclovir 1 gram tablet, Disp: , Rfl:   •  metoprolol succinate XL (TOPROL-XL) 25 MG 24 hr tablet, Take 1 tablet by mouth Every Night for 360 days., Disp: 30 tablet, Rfl: 11    Vitals:    11/11/19 1413   BP: 118/90   BP Location: Right arm   Patient Position: Sitting   Cuff Size: Adult   Pulse: 88   SpO2: 98%   Weight: 73.9 kg (163 lb)   Height: 152.4 cm (60\")       Physical Exam:     Physical Exam   Constitutional: She is oriented to person, place, and time. She appears well-developed and well-nourished. No distress.   HENT:   Head: Normocephalic and " atraumatic.   Eyes: Conjunctivae are normal. Pupils are equal, round, and reactive to light. No scleral icterus.   Neck: Trachea normal, normal range of motion and full passive range of motion without pain. Neck supple. No JVD present. Carotid bruit is not present. No thyroid mass and no thyromegaly present.   Cardiovascular: Normal rate, regular rhythm, S1 normal, S2 normal, normal heart sounds and intact distal pulses. PMI is not displaced. Exam reveals no gallop and no friction rub.   No murmur heard.  Pulses:       Carotid pulses are 1+ on the right side, and 1+ on the left side.  Pulmonary/Chest: Effort normal and breath sounds normal. No respiratory distress. She has no wheezes. She has no rales. She exhibits no tenderness.   Abdominal: Soft. Bowel sounds are normal. She exhibits no distension and no mass. There is no tenderness.   Musculoskeletal: Normal range of motion. She exhibits no edema.   Neurological: She is alert and oriented to person, place, and time. No sensory deficit.   Skin: Skin is warm and dry. Capillary refill takes less than 2 seconds. No rash noted. She is not diaphoretic.   Psychiatric: She has a normal mood and affect. Her behavior is normal. Judgment and thought content normal.   Nursing note and vitals reviewed.      Procedures    Results:   Medication allergy profile and updated as directed.  Reviewed vital signs, blood pressure is 118/90 with resting heart rate of 88 bpm.    Assessment/Plan:   Marginally controlled high blood pressure.  Will supplement lisinopril with Toprol XL 25 mg 1 p.o. Nightly.  Instructed patient to limit sodium/salt intake to less than 1500 mg per 24 hours.  Follow-up in 1 year or as needed for any cardiac related issues.  Lesly was seen today for follow-up and palpitations.    Diagnoses and all orders for this visit:    Palpitations  -     metoprolol succinate XL (TOPROL-XL) 25 MG 24 hr tablet; Take 1 tablet by mouth Every Night for 360 days.    Essential  hypertension  -     metoprolol succinate XL (TOPROL-XL) 25 MG 24 hr tablet; Take 1 tablet by mouth Every Night for 360 days.    Pure hypercholesterolemia  -     metoprolol succinate XL (TOPROL-XL) 25 MG 24 hr tablet; Take 1 tablet by mouth Every Night for 360 days.    Chest pain due to GERD  -     metoprolol succinate XL (TOPROL-XL) 25 MG 24 hr tablet; Take 1 tablet by mouth Every Night for 360 days.        Return in about 1 year (around 11/11/2020).    NADINE Hidalgo

## 2020-09-16 ENCOUNTER — TRANSCRIBE ORDERS (OUTPATIENT)
Dept: ADMINISTRATIVE | Facility: HOSPITAL | Age: 50
End: 2020-09-16

## 2020-09-16 DIAGNOSIS — Z12.31 ENCOUNTER FOR SCREENING MAMMOGRAM FOR BREAST CANCER: Primary | ICD-10-CM

## 2020-10-28 ENCOUNTER — HOSPITAL ENCOUNTER (OUTPATIENT)
Dept: MAMMOGRAPHY | Facility: HOSPITAL | Age: 50
Discharge: HOME | End: 2020-10-28
Admitting: INTERNAL MEDICINE

## 2020-10-28 DIAGNOSIS — Z12.31 ENCOUNTER FOR SCREENING MAMMOGRAM FOR BREAST CANCER: ICD-10-CM

## 2020-10-28 PROCEDURE — 77067 SCR MAMMO BI INCL CAD: CPT

## 2020-10-28 PROCEDURE — 77063 BREAST TOMOSYNTHESIS BI: CPT

## 2020-11-11 ENCOUNTER — OFFICE VISIT (OUTPATIENT)
Dept: CARDIOLOGY | Facility: CLINIC | Age: 50
End: 2020-11-11

## 2020-11-11 VITALS
HEART RATE: 77 BPM | DIASTOLIC BLOOD PRESSURE: 80 MMHG | HEIGHT: 61 IN | SYSTOLIC BLOOD PRESSURE: 120 MMHG | WEIGHT: 168 LBS | OXYGEN SATURATION: 96 % | BODY MASS INDEX: 31.72 KG/M2

## 2020-11-11 DIAGNOSIS — R00.2 PALPITATIONS: ICD-10-CM

## 2020-11-11 DIAGNOSIS — I10 ESSENTIAL HYPERTENSION: Primary | ICD-10-CM

## 2020-11-11 DIAGNOSIS — E78.5 DYSLIPIDEMIA: ICD-10-CM

## 2020-11-11 PROCEDURE — 99213 OFFICE O/P EST LOW 20 MIN: CPT | Performed by: INTERNAL MEDICINE

## 2020-11-11 RX ORDER — LISINOPRIL 20 MG/1
20 TABLET ORAL DAILY
COMMUNITY
Start: 2020-09-17

## 2020-11-11 RX ORDER — CLONAZEPAM 0.5 MG/1
TABLET ORAL
COMMUNITY
Start: 2020-10-20 | End: 2021-05-05

## 2020-11-11 RX ORDER — METOPROLOL SUCCINATE 25 MG/1
25 TABLET, EXTENDED RELEASE ORAL DAILY
COMMUNITY
End: 2020-11-16

## 2020-11-11 RX ORDER — CHLORAL HYDRATE 500 MG
CAPSULE ORAL
COMMUNITY

## 2020-11-11 RX ORDER — UBIDECARENONE 100 MG
100 CAPSULE ORAL DAILY
COMMUNITY
End: 2022-02-14

## 2020-11-11 RX ORDER — ATORVASTATIN CALCIUM 80 MG/1
TABLET, FILM COATED ORAL
COMMUNITY
Start: 2020-08-09 | End: 2021-11-15

## 2020-11-11 RX ORDER — FERROUS SULFATE 325(65) MG
325 TABLET ORAL
COMMUNITY
End: 2021-11-15 | Stop reason: HOSPADM

## 2020-11-11 NOTE — PROGRESS NOTES
Subjective:     Encounter Date:11/11/2020      Patient ID: Lesly Nicholas is a 49 y.o. female.    Chief Complaint: Palpitations  HPI  This is a 49-year-old female patient who presents to cardiology clinic for routine follow-up.  Since her last visit she indicates that her palpitations have all but completely resolved without specific intervention.  The patient has no chest discomfort at rest or with activity.  There is no exertional chest arm neck jaw shoulder or back discomfort.  There is no orthopnea PND or lower extremity edema.  There is no dizziness palpitations or syncope.  The following portions of the patient's history were reviewed and updated as appropriate: allergies, current medications, past family history, past medical history, past social history, past surgical history and problem  Review of Systems   Constitution: Negative for chills, diaphoresis, fever, malaise/fatigue, weight gain and weight loss.   HENT: Negative for ear discharge, hearing loss, hoarse voice and nosebleeds.    Eyes: Negative for discharge, double vision, pain and photophobia.   Cardiovascular: Negative for chest pain, claudication, cyanosis, dyspnea on exertion, irregular heartbeat, leg swelling, near-syncope, orthopnea, palpitations and paroxysmal nocturnal dyspnea.   Respiratory: Negative for cough, hemoptysis, shortness of breath, sputum production and wheezing.    Endocrine: Negative for cold intolerance, heat intolerance, polydipsia, polyphagia and polyuria.   Hematologic/Lymphatic: Negative for adenopathy and bleeding problem. Does not bruise/bleed easily.   Skin: Negative for color change, flushing, itching and rash.   Musculoskeletal: Negative for muscle cramps, muscle weakness, myalgias and stiffness.   Gastrointestinal: Negative for abdominal pain, diarrhea, hematemesis, hematochezia, nausea and vomiting.   Genitourinary: Negative for dysuria, frequency and nocturia.   Neurological: Negative for focal  weakness, loss of balance, numbness, paresthesias and seizures.   Psychiatric/Behavioral: Negative for altered mental status, hallucinations and suicidal ideas.   Allergic/Immunologic: Negative for HIV exposure, hives and persistent infections.           Current Outpatient Medications:   •  atorvastatin (LIPITOR) 80 MG tablet, , Disp: , Rfl:   •  clonazePAM (KlonoPIN) 0.5 MG tablet, , Disp: , Rfl:   •  coenzyme Q10 100 MG capsule, Take 100 mg by mouth Daily., Disp: , Rfl:   •  ferrous sulfate 325 (65 FE) MG tablet, Take 325 mg by mouth Daily With Breakfast., Disp: , Rfl:   •  FLUoxetine (PROzac) 40 MG capsule, 80 mg., Disp: , Rfl:   •  lisinopril (PRINIVIL,ZESTRIL) 20 MG tablet, , Disp: , Rfl:   •  meloxicam (MOBIC) 15 MG tablet, Take 15 mg by mouth Daily., Disp: , Rfl:   •  metoprolol succinate XL (TOPROL-XL) 25 MG 24 hr tablet, Take 25 mg by mouth Daily., Disp: , Rfl:   •  Omega-3 Fatty Acids (fish oil) 1000 MG capsule capsule, Take  by mouth Daily With Breakfast., Disp: , Rfl:   •  pantoprazole (PROTONIX) 40 MG EC tablet, , Disp: , Rfl:   •  Potassium Gluconate (SM POTASSIUM PO), Take  by mouth., Disp: , Rfl:   •  valACYclovir (VALTREX) 1000 MG tablet, valacyclovir 1 gram tablet, Disp: , Rfl:     Objective:   Vitals signs and nursing note reviewed.   Constitutional:       Appearance: Healthy appearance. Not in distress.   Neck:      Vascular: No JVR. JVD normal.   Pulmonary:      Effort: Pulmonary effort is normal.      Breath sounds: Normal breath sounds. No wheezing. No rhonchi. No rales.   Chest:      Chest wall: Not tender to palpatation.   Cardiovascular:      PMI at left midclavicular line. Normal rate. Regular rhythm. Normal S1. Normal S2.      Murmurs: There is no murmur.      No gallop. No click. No rub.   Pulses:     Intact distal pulses.   Edema:     Peripheral edema absent.   Abdominal:      General: Bowel sounds are normal.      Palpations: Abdomen is soft.      Tenderness: There is no abdominal  "tenderness.   Musculoskeletal: Normal range of motion.         General: No tenderness.   Skin:     General: Skin is warm and dry.   Neurological:      General: No focal deficit present.      Mental Status: Alert and oriented to person, place and time.       Blood pressure 120/80, pulse 77, height 154.9 cm (61\"), weight 76.2 kg (168 lb), SpO2 96 %.   Lab Review:     Assessment:       1. Essential hypertension  Acceptable blood pressure control.    2. Palpitations  Near complete resolution of symptoms.  No evidence of arrhythmia or ectopy by previous cardiac monitoring.    3. Dyslipidemia  This is followed closely by her primary care provider.    Procedures    Plan:     No changes in her medication therapy have been made at today's visit.  No additional cardiovascular testing is warranted.      "

## 2020-11-16 RX ORDER — METOPROLOL SUCCINATE 25 MG/1
TABLET, EXTENDED RELEASE ORAL
Qty: 90 TABLET | Refills: 3 | Status: SHIPPED | OUTPATIENT
Start: 2020-11-16 | End: 2021-05-05 | Stop reason: SDUPTHER

## 2020-11-18 ENCOUNTER — TELEPHONE (OUTPATIENT)
Dept: CARDIOLOGY | Facility: CLINIC | Age: 50
End: 2020-11-18

## 2020-12-16 ENCOUNTER — TRANSCRIBE ORDERS (OUTPATIENT)
Dept: LAB | Facility: HOSPITAL | Age: 50
End: 2020-12-16

## 2020-12-16 ENCOUNTER — LAB (OUTPATIENT)
Dept: LAB | Facility: HOSPITAL | Age: 50
End: 2020-12-16

## 2020-12-16 DIAGNOSIS — Z03.89 OBSERVATION FOR SUSPECTED GENETIC OR METABOLIC CONDITION: Primary | ICD-10-CM

## 2020-12-16 DIAGNOSIS — Z03.89 OBSERVATION FOR SUSPECTED GENETIC OR METABOLIC CONDITION: ICD-10-CM

## 2020-12-16 PROCEDURE — C9803 HOPD COVID-19 SPEC COLLECT: HCPCS

## 2020-12-16 PROCEDURE — U0004 COV-19 TEST NON-CDC HGH THRU: HCPCS

## 2020-12-17 LAB — SARS-COV-2 RNA RESP QL NAA+PROBE: NOT DETECTED

## 2021-05-03 ENCOUNTER — TELEPHONE (OUTPATIENT)
Dept: SURGERY | Facility: CLINIC | Age: 51
End: 2021-05-03

## 2021-05-05 ENCOUNTER — OFFICE VISIT (OUTPATIENT)
Dept: OBSTETRICS AND GYNECOLOGY | Facility: CLINIC | Age: 51
End: 2021-05-05

## 2021-05-05 VITALS
BODY MASS INDEX: 30.78 KG/M2 | WEIGHT: 163 LBS | HEIGHT: 61 IN | DIASTOLIC BLOOD PRESSURE: 70 MMHG | SYSTOLIC BLOOD PRESSURE: 110 MMHG

## 2021-05-05 DIAGNOSIS — Z12.31 ENCOUNTER FOR SCREENING MAMMOGRAM FOR BREAST CANCER: ICD-10-CM

## 2021-05-05 DIAGNOSIS — Z01.419 ENCOUNTER FOR GYNECOLOGICAL EXAMINATION WITHOUT ABNORMAL FINDING: Primary | ICD-10-CM

## 2021-05-05 DIAGNOSIS — Z12.4 SCREENING FOR MALIGNANT NEOPLASM OF CERVIX: ICD-10-CM

## 2021-05-05 PROCEDURE — 99386 PREV VISIT NEW AGE 40-64: CPT | Performed by: OBSTETRICS & GYNECOLOGY

## 2021-05-05 RX ORDER — TEMAZEPAM 15 MG/1
CAPSULE ORAL
COMMUNITY
Start: 2021-05-02 | End: 2021-11-15

## 2021-05-05 NOTE — PROGRESS NOTES
Subjective   Chief Complaint   Patient presents with   • Gynecologic Exam     last pap 2017 WNL , Last Mammogram @ 10/28/20, order placed for new Mammogram in October      Lesly Nicholas is a 50 y.o. year old  presenting to be seen for her annual exam.     SEXUAL Hx:  She is currently sexually active.  In the past year there has not been new sexual partners.    Condoms are not typically used.  She would not like to be screened for STD's at today's exam.  Current birth control method: not using any form of contraception and does not wish to get pregnant.  MENSTRUAL Hx:  No LMP recorded. Patient has had an ablation.  In the past 6 months her cycles have been absent.   Her menstrual flow has been absent.   Each month on average there are roughly 0 days of very heavy flow.    Intermenstrual bleeding is absent.    Post-coital bleeding is absent.  Dysmenorrhea: is not affecting her activities of daily living  PMS: is not affecting her activities of daily living  Her cycles are not a source of concern for her that she wishes to discuss today.  HEALTH Hx:  She exercises regularly: no (and has no plans to become more active).  She wears her seat belt:yes.  She has concerns about domestic violence: no.  OTHER COMPLAINTS:  Nothing else    The following portions of the patient's history were reviewed and updated as appropriate:  She  has a past medical history of Anxiety, Arthritis, Depression, GERD (gastroesophageal reflux disease), Hyperlipemia, Hypertension, IBS (irritable bowel syndrome), Kidney disease, Kidney stone, Menometrorrhagia (), Osteoporosis, Reflux esophagitis, and Urinary tract infection.  She does not have any pertinent problems on file.  She  has a past surgical history that includes d & c hysteroscopy endometrial ablation (2011); Gallbladder surgery; Dalbo tooth extraction; Cholecystectomy; and Tubal ligation.  Her family history includes Coronary artery disease in her paternal  uncle; Heart attack in her paternal uncle; Hypertension in her father and mother; Kidney disease in her father; Osteoporosis in her paternal grandmother.  She  reports that she has quit smoking. She has never used smokeless tobacco. She reports that she does not drink alcohol and does not use drugs.  Current Outpatient Medications   Medication Sig Dispense Refill   • atorvastatin (LIPITOR) 80 MG tablet      • coenzyme Q10 100 MG capsule Take 100 mg by mouth Daily.     • ferrous sulfate 325 (65 FE) MG tablet Take 325 mg by mouth Daily With Breakfast.     • FLUoxetine (PROzac) 40 MG capsule 80 mg.     • lisinopril (PRINIVIL,ZESTRIL) 20 MG tablet      • metoprolol succinate XL (TOPROL-XL) 25 MG 24 hr tablet Take 1 tablet by mouth Every Night for 360 days. 30 tablet 11   • Omega-3 Fatty Acids (fish oil) 1000 MG capsule capsule Take  by mouth Daily With Breakfast.     • pantoprazole (PROTONIX) 40 MG EC tablet      • Potassium Gluconate (SM POTASSIUM PO) Take  by mouth.     • temazepam (RESTORIL) 15 MG capsule        No current facility-administered medications for this visit.     Current Outpatient Medications on File Prior to Visit   Medication Sig   • atorvastatin (LIPITOR) 80 MG tablet    • coenzyme Q10 100 MG capsule Take 100 mg by mouth Daily.   • ferrous sulfate 325 (65 FE) MG tablet Take 325 mg by mouth Daily With Breakfast.   • FLUoxetine (PROzac) 40 MG capsule 80 mg.   • lisinopril (PRINIVIL,ZESTRIL) 20 MG tablet    • metoprolol succinate XL (TOPROL-XL) 25 MG 24 hr tablet Take 1 tablet by mouth Every Night for 360 days.   • Omega-3 Fatty Acids (fish oil) 1000 MG capsule capsule Take  by mouth Daily With Breakfast.   • pantoprazole (PROTONIX) 40 MG EC tablet    • Potassium Gluconate (SM POTASSIUM PO) Take  by mouth.   • temazepam (RESTORIL) 15 MG capsule    • [DISCONTINUED] clonazePAM (KlonoPIN) 0.5 MG tablet    • [DISCONTINUED] meloxicam (MOBIC) 15 MG tablet Take 15 mg by mouth Daily.   • [DISCONTINUED]  "metoprolol succinate XL (TOPROL-XL) 25 MG 24 hr tablet TAKE ONE TABLET BY MOUTH ONCE NIGHTLY   • [DISCONTINUED] valACYclovir (VALTREX) 1000 MG tablet valacyclovir 1 gram tablet     No current facility-administered medications on file prior to visit.     She is allergic to seroquel [quetiapine fumarate], trazodone and nefazodone, and wasp venom..    Social History    Tobacco Use      Smoking status: Former Smoker      Smokeless tobacco: Never Used    Review of Systems  Consitutional NEG: anorexia or night sweats    POS: nothing reported   Gastointestinal NEG: bloating, change in bowel habits, melena or reflux symptoms    POS: nothing reported   Genitourinary NEG: dysuria or hematuria    POS: nothing reported   Integument NEG: moles that are changing in size, shape, color or rashes    POS: nothing reported   Breast NEG: persistent breast lump, skin dimpling or nipple discharge    POS: nothing reported          Objective   /70   Ht 154.9 cm (61\")   Wt 73.9 kg (163 lb)   BMI 30.80 kg/m²     General:  well developed; well nourished  no acute distress   Skin:  No suspicious lesions seen   Thyroid: normal to inspection and palpation   Breasts:  Examined in supine position  Symmetric without masses or skin dimpling  Nipples normal without inversion, lesions or discharge  There are no palpable axillary nodes   Abdomen: soft, non-tender; no masses  no umbilical or inguinal hernias are present  no hepato-splenomegaly   Pelvis: Clinical staff was present for exam  External genitalia:  normal appearance of the external genitalia including Bartholin's and Hornbrook's glands.  :  urethral meatus normal;  Vaginal:  normal pink mucosa without prolapse or lesions.  Cervix:  normal appearance.  Uterus:  normal size, shape and consistency.  Adnexa:  normal bimanual exam of the adnexa.  Rectal:  digital rectal exam not performed; anus visually normal appearing.   Mammo Screening Digital Tomosynthesis Bilateral With CAD " (10/28/2020 14:33)       Assessment   1. Normal PE  2. Mixed incontinence-- one episdoe a day or so-- can spont lose urine, some urgency.      Plan   1. PAP done  2. MMG   3. Colonoscopy set up   4. Kegel's, dicussed vesicare    No orders of the defined types were placed in this encounter.         This note was electronically signed.      May 5, 2021

## 2021-05-10 ENCOUNTER — PREP FOR SURGERY (OUTPATIENT)
Dept: OTHER | Facility: HOSPITAL | Age: 51
End: 2021-05-10

## 2021-05-10 ENCOUNTER — TELEPHONE (OUTPATIENT)
Dept: SURGERY | Facility: CLINIC | Age: 51
End: 2021-05-10

## 2021-05-10 DIAGNOSIS — Z12.11 COLON CANCER SCREENING: Primary | ICD-10-CM

## 2021-05-10 RX ORDER — POLYETHYLENE GLYCOL 3350 17 G/17G
POWDER, FOR SOLUTION ORAL
Qty: 238 G | Refills: 0 | Status: SHIPPED | OUTPATIENT
Start: 2021-05-10 | End: 2021-11-15 | Stop reason: ALTCHOICE

## 2021-05-10 RX ORDER — BISACODYL 5 MG
TABLET, DELAYED RELEASE (ENTERIC COATED) ORAL
Qty: 4 TABLET | Refills: 0 | Status: SHIPPED | OUTPATIENT
Start: 2021-05-10 | End: 2021-11-15

## 2021-05-10 NOTE — TELEPHONE ENCOUNTER
PRESCREENING FOR OPEN ACCESS SCHEDULING    Lesly Nicholas, 1970  [id number]    05/10/21    If, the patient answers yes to any of the following questions the provider will be informed prior to scheduling open access for approval and documented in the chart.    [x]  Yes  [] No    1. Have you ever had a colonoscopy in the past?      When:        Where:       Polyps or other:     []  Yes  [x] No    2. Family history of colon cancer?      Relation:       Age of onset:       Do you currently have any of the following?    []  Yes  [x] No  Rectal bleeding, if so, how long?     []  Yes  [x] No  Abdominal pain, if so, how long?    []  Yes  [x] No  Constipation, if so, how long?    []  Yes  [x] No  Diarrhea, if so, how long?    []  Yes  [x] No  Weight loss, is so, how much?    [] Yes  [x] No  Small caliber stool, if so, how long?      Have you ever had any of the following conditions?    [] Yes  [x] No  Heart attack?      When?       Last cardiac workup?     Blood thinners?    [] Yes  [x] No   Lung problems, asthma or COPD?  [] Yes  [x] No  Oxygen required?       [] Yes  [x] No  Stroke?     [] Yes  [x] No  Have you ever had a reaction to anesthesia?

## 2021-05-12 DIAGNOSIS — Z01.419 ENCOUNTER FOR GYNECOLOGICAL EXAMINATION WITHOUT ABNORMAL FINDING: ICD-10-CM

## 2021-05-12 PROBLEM — Z12.11 COLON CANCER SCREENING: Status: ACTIVE | Noted: 2021-05-12

## 2021-06-07 ENCOUNTER — ANESTHESIA EVENT (OUTPATIENT)
Dept: GASTROENTEROLOGY | Facility: HOSPITAL | Age: 51
End: 2021-06-07

## 2021-06-07 ENCOUNTER — HOSPITAL ENCOUNTER (OUTPATIENT)
Facility: HOSPITAL | Age: 51
Setting detail: HOSPITAL OUTPATIENT SURGERY
Discharge: HOME | End: 2021-06-07
Attending: SURGERY | Admitting: SURGERY

## 2021-06-07 ENCOUNTER — ANESTHESIA (OUTPATIENT)
Dept: GASTROENTEROLOGY | Facility: HOSPITAL | Age: 51
End: 2021-06-07

## 2021-06-07 VITALS
DIASTOLIC BLOOD PRESSURE: 70 MMHG | HEART RATE: 63 BPM | WEIGHT: 160 LBS | HEIGHT: 59 IN | BODY MASS INDEX: 32.25 KG/M2 | TEMPERATURE: 98.5 F | SYSTOLIC BLOOD PRESSURE: 100 MMHG | OXYGEN SATURATION: 96 % | RESPIRATION RATE: 16 BRPM

## 2021-06-07 PROCEDURE — S0260 H&P FOR SURGERY: HCPCS | Performed by: SURGERY

## 2021-06-07 PROCEDURE — 25010000002 FENTANYL CITRATE (PF) 100 MCG/2ML SOLUTION: Performed by: NURSE ANESTHETIST, CERTIFIED REGISTERED

## 2021-06-07 PROCEDURE — 25010000002 PROPOFOL 1000 MG/100ML EMULSION: Performed by: NURSE ANESTHETIST, CERTIFIED REGISTERED

## 2021-06-07 PROCEDURE — 45378 DIAGNOSTIC COLONOSCOPY: CPT | Performed by: SURGERY

## 2021-06-07 RX ORDER — ONDANSETRON 2 MG/ML
4 INJECTION INTRAMUSCULAR; INTRAVENOUS ONCE AS NEEDED
Status: DISCONTINUED | OUTPATIENT
Start: 2021-06-07 | End: 2021-06-07 | Stop reason: HOSPADM

## 2021-06-07 RX ORDER — PROPOFOL 10 MG/ML
INJECTION, EMULSION INTRAVENOUS AS NEEDED
Status: DISCONTINUED | OUTPATIENT
Start: 2021-06-07 | End: 2021-06-07 | Stop reason: SURG

## 2021-06-07 RX ORDER — SODIUM CHLORIDE, SODIUM LACTATE, POTASSIUM CHLORIDE, CALCIUM CHLORIDE 600; 310; 30; 20 MG/100ML; MG/100ML; MG/100ML; MG/100ML
1000 INJECTION, SOLUTION INTRAVENOUS CONTINUOUS
Status: DISCONTINUED | OUTPATIENT
Start: 2021-06-07 | End: 2021-06-07 | Stop reason: HOSPADM

## 2021-06-07 RX ORDER — FENTANYL CITRATE 50 UG/ML
INJECTION, SOLUTION INTRAMUSCULAR; INTRAVENOUS AS NEEDED
Status: DISCONTINUED | OUTPATIENT
Start: 2021-06-07 | End: 2021-06-07 | Stop reason: SURG

## 2021-06-07 RX ORDER — LIDOCAINE HYDROCHLORIDE 20 MG/ML
INJECTION, SOLUTION INTRAVENOUS AS NEEDED
Status: DISCONTINUED | OUTPATIENT
Start: 2021-06-07 | End: 2021-06-07 | Stop reason: SURG

## 2021-06-07 RX ORDER — SODIUM CHLORIDE 0.9 % (FLUSH) 0.9 %
10 SYRINGE (ML) INJECTION AS NEEDED
Status: DISCONTINUED | OUTPATIENT
Start: 2021-06-07 | End: 2021-06-07 | Stop reason: HOSPADM

## 2021-06-07 RX ADMIN — LIDOCAINE HYDROCHLORIDE 100 MG: 20 INJECTION, SOLUTION INTRAVENOUS at 10:14

## 2021-06-07 RX ADMIN — PROPOFOL 50 MG: 10 INJECTION, EMULSION INTRAVENOUS at 10:23

## 2021-06-07 RX ADMIN — FENTANYL CITRATE 100 MCG: 50 INJECTION, SOLUTION INTRAMUSCULAR; INTRAVENOUS at 10:14

## 2021-06-07 RX ADMIN — PROPOFOL 50 MG: 10 INJECTION, EMULSION INTRAVENOUS at 10:14

## 2021-06-07 RX ADMIN — SODIUM CHLORIDE, POTASSIUM CHLORIDE, SODIUM LACTATE AND CALCIUM CHLORIDE 1000 ML: 600; 310; 30; 20 INJECTION, SOLUTION INTRAVENOUS at 09:51

## 2021-06-07 NOTE — DISCHARGE INSTRUCTIONS
Please follow all post op instructions and follow up appointment time from your physician's office included in your discharge packet.    Rest today    No pushing, pulling, tugging,  heavy lifting, or strenuous activity.  No major decision making, driving, or drinking alcoholic beverages for 24 hours. ( due to the medications you have  received)  Always use good hand hygiene/washing techniques.  NO driving while taking pain medications.    To assist you in voiding:  Drink plenty of fluids  Listen to running water while attempting to void.    If you are unable to urinate and you have an uncomfortable urge to void or it has been   6 hours since you were discharged, return to the Emergency Room

## 2021-06-07 NOTE — ANESTHESIA PREPROCEDURE EVALUATION
Anesthesia Evaluation     Patient summary reviewed and Nursing notes reviewed   no history of anesthetic complications:  NPO Solid Status: > 8 hours  NPO Liquid Status: > 8 hours           Airway   Mallampati: II  TM distance: >3 FB  Neck ROM: full  No difficulty expected  Dental - normal exam     Pulmonary    (+) a smoker Former, decreased breath sounds,   Cardiovascular - normal exam  Exercise tolerance: good (4-7 METS)    ECG reviewed  Patient on routine beta blocker    (+) hypertension, hyperlipidemia,       Neuro/Psych  (+) psychiatric history Anxiety and Depression,     GI/Hepatic/Renal/Endo    (+)  GERD,      Musculoskeletal     Abdominal    Substance History - negative use     OB/GYN          Other   arthritis,                    Anesthesia Plan    ASA 3     MAC   (Risks and benefits discussed including risk of aspiration, recall and dental damage. All patient questions answered.    Will continue with plan of care.)  intravenous induction     Anesthetic plan, all risks, benefits, and alternatives have been provided, discussed and informed consent has been obtained with: patient.

## 2021-06-07 NOTE — H&P
Reason for Consultation:  Screening colonoscopy    Chief complaint :  Screening colonoscopy    SUBJECTIVE:    History of present illness:  I did see the patient today as a consultation for evaluation and treatment of a need for screening colonoscopy.  There are no other complaints of rectal bleeding or abdominal pain.  Last colonoscopy over 10 years ago.  No family history of colon cancer.    Review of Systems:    Review of Systems - General ROS: negative for - chills, fatigue, fever, hot flashes, malaise or night sweats  Psychological ROS: negative for - behavioral disorder, disorientation, hallucinations, hostility or mood swings  ENT ROS: negative for - nasal polyps, oral lesions, sinus pain, sneezing or sore throat  Breast ROS: negative for - galactorrhea or new or changing breast lumps  Respiratory ROS: negative for - hemoptysis, orthopnea, pleuritic pain, sputum changes or stridor  Cardiovascular ROS: negative for - dyspnea on exertion, edema, irregular heartbeat, murmur, orthopnea, palpitations or rapid heart rate  Gastrointestinal ROS: negative for - change in stools, gas/bloating, hematemesis, melena or stool incontinence.  Genito-Urinary ROS: negative for - dysuria, genital ulcers, nocturia or pelvic pain  Musculoskeletal ROS: negative for - gait disturbance or muscle pain  Neurological ROS: negative for - dizziness, gait disturbance, memory loss, numbness/tingling or seizures      Allergies:  Allergies   Allergen Reactions   • Seroquel [Quetiapine Fumarate] Anaphylaxis   • Trazodone And Nefazodone Anaphylaxis   • Wasp Venom Anaphylaxis       Medications:    Current Facility-Administered Medications:   •  lactated ringers infusion 1,000 mL, 1,000 mL, Intravenous, Continuous, Anna Vann MD, Last Rate: 25 mL/hr at 06/07/21 0951, 1,000 mL at 06/07/21 0951  •  sodium chloride 0.9 % flush 10 mL, 10 mL, Intravenous, PRN, Anna Vann MD    History:  Past Medical History:   Diagnosis Date   •  Anxiety    • Arthritis    • Depression    • GERD (gastroesophageal reflux disease)    • Hyperlipemia    • Hypertension    • IBS (irritable bowel syndrome)    • Kidney disease    • Kidney stone    • Menometrorrhagia 2011   • Osteoporosis    • Reflux esophagitis    • Urinary tract infection        Past Surgical History:   Procedure Laterality Date   • CHOLECYSTECTOMY     • D & C HYSTEROSCOPY ENDOMETRIAL ABLATION  12/29/2011    NOVASURE ABLATION (DR MILLER)   • GALLBLADDER SURGERY     • TUBAL ABDOMINAL LIGATION     • WISDOM TOOTH EXTRACTION         Family History   Problem Relation Age of Onset   • Hypertension Father    • Kidney disease Father    • Hypertension Mother    • Osteoporosis Paternal Grandmother    • Coronary artery disease Paternal Uncle    • Heart attack Paternal Uncle    • Breast cancer Neg Hx        Social History     Tobacco Use   • Smoking status: Former Smoker   • Smokeless tobacco: Never Used   Substance Use Topics   • Alcohol use: No   • Drug use: No          OBJECTIVE:    Vital Signs   Temp:  [97.2 °F (36.2 °C)] 97.2 °F (36.2 °C)  Heart Rate:  [67] 67  Resp:  [18] 18  BP: (119)/(78) 119/78    Physical Exam:     General Appearance:    Alert, cooperative, in no acute distress   Head:    Normocephalic, without obvious abnormality, atraumatic   Eyes:            Lids and lashes normal, conjunctivae and sclerae normal, no   icterus, no pallor, corneas clear, PERRLA   Ears:    Ears appear intact with no abnormalities noted   Throat:   No oral lesions, no thrush, oral mucosa moist   Neck:   No adenopathy, supple, trachea midline, no thyromegaly, no   carotid bruit, no JVD   Back:     No kyphosis present, no scoliosis present, no skin lesions,      erythema or scars, no tenderness to percussion or                   palpation,   range of motion normal   Lungs:     Clear to auscultation,respirations regular, even and                  unlabored    Heart:    Regular rhythm and normal rate, normal S1 and S2,  no            murmur, no gallop, no rub, no click   Chest Wall:    No abnormalities observed   Abdomen:     Normal bowel sounds, no masses, no organomegaly, soft        non-tender, non-distended, no guarding, there is no evidence of tenderness   Extremities:   Moves all extremities well, no edema, no cyanosis, no             redness   Pulses:   Pulses palpable and equal bilaterally   Skin:   No bleeding, bruising or rash   Lymph nodes:   No palpable adenopathy   Neurologic:   Cranial nerves 2 - 12 grossly intact, sensation intact, DTR       present and equal bilaterally           ASSESSMENT/PLAN:    Screening colonoscopy      I did have a detailed and extensive discussion with the patient in the office today.  The full risks and benefits of operative versus nonoperative intervention were discussed with the paient, they understand, agree, and wish to proceed with the surgical treatment plan of colonoscopy.      Anna Vann MD

## 2021-06-07 NOTE — ANESTHESIA POSTPROCEDURE EVALUATION
Patient: Lesly LINDSAY Nicholas    Procedure Summary     Date: 06/07/21 Room / Location: [facility] ENDOSCOPY 3 / [facility] ENDOSCOPY    Anesthesia Start: 1013 Anesthesia Stop: 1029    Procedure: COLONOSCOPY (N/A ) Diagnosis:       Colon cancer screening      (Colon cancer screening [Z12.11])    Surgeons: Anna Vann MD Provider: Neal Dorsey CRNA    Anesthesia Type: MAC ASA Status: 3          Anesthesia Type: MAC    Vitals  Vitals Value Taken Time   /70 06/07/21 1100   Temp 98.5 °F (36.9 °C) 06/07/21 1028   Pulse 63 06/07/21 1100   Resp 16 06/07/21 1100   SpO2 96 % 06/07/21 1100           Post Anesthesia Care and Evaluation    Patient location during evaluation: bedside  Patient participation: complete - patient participated  Level of consciousness: awake  Pain score: 0  Pain management: adequate  Airway patency: patent  Anesthetic complications: No anesthetic complications  PONV Status: controlled  Cardiovascular status: acceptable and stable  Respiratory status: acceptable and room air  Hydration status: acceptable

## 2021-11-03 ENCOUNTER — HOSPITAL ENCOUNTER (OUTPATIENT)
Dept: MAMMOGRAPHY | Facility: HOSPITAL | Age: 51
Discharge: HOME | End: 2021-11-03
Admitting: OBSTETRICS & GYNECOLOGY

## 2021-11-03 DIAGNOSIS — Z01.419 ENCOUNTER FOR GYNECOLOGICAL EXAMINATION WITHOUT ABNORMAL FINDING: ICD-10-CM

## 2021-11-03 PROCEDURE — 77067 SCR MAMMO BI INCL CAD: CPT

## 2021-11-03 PROCEDURE — 77063 BREAST TOMOSYNTHESIS BI: CPT

## 2021-11-15 ENCOUNTER — OFFICE VISIT (OUTPATIENT)
Dept: CARDIOLOGY | Facility: CLINIC | Age: 51
End: 2021-11-15

## 2021-11-15 VITALS
WEIGHT: 152 LBS | HEART RATE: 68 BPM | DIASTOLIC BLOOD PRESSURE: 64 MMHG | BODY MASS INDEX: 30.64 KG/M2 | SYSTOLIC BLOOD PRESSURE: 100 MMHG | OXYGEN SATURATION: 98 % | HEIGHT: 59 IN | RESPIRATION RATE: 18 BRPM

## 2021-11-15 DIAGNOSIS — I10 ESSENTIAL HYPERTENSION: Primary | ICD-10-CM

## 2021-11-15 DIAGNOSIS — E78.5 DYSLIPIDEMIA: ICD-10-CM

## 2021-11-15 DIAGNOSIS — R00.2 PALPITATIONS: ICD-10-CM

## 2021-11-15 PROCEDURE — 99213 OFFICE O/P EST LOW 20 MIN: CPT | Performed by: NURSE PRACTITIONER

## 2021-11-15 RX ORDER — ACYCLOVIR 400 MG/1
TABLET ORAL
COMMUNITY
End: 2022-07-13 | Stop reason: HOSPADM

## 2021-11-15 RX ORDER — CLONAZEPAM 1 MG/1
TABLET ORAL 2 TIMES DAILY
COMMUNITY
Start: 2021-11-09 | End: 2022-07-13 | Stop reason: HOSPADM

## 2021-11-15 RX ORDER — PRAVASTATIN SODIUM 40 MG
40 TABLET ORAL DAILY
COMMUNITY
End: 2022-02-14

## 2021-11-15 NOTE — PROGRESS NOTES
[facility] Cardiology Office Follow Up Note    Lesly Nicholas  [id number]  11/15/2021    Primary Care Provider: Aimee Canela MD   Referring Provider: No ref. provider found    Chief Complaint: Regular follow-up    History of Present Illness:   Mrs. Lesly Nicholas is a 50 y.o. female who presents to the Cardiology Clinic for regular follow-up.  The patient has a past medical history of hypertension, dyslipidemia, palpitations, GERD, anxiety, and remote tobacco abuse.  She presents today for regular follow-up.  The patient reports feeling well from a cardiac standpoint since her last clinic visit.  She ultimately had to discontinue high-dose atorvastatin due to myalgias, but her PCP started her on pravastatin.  She states that her total cholesterol was over 300, but her LDL was 91.  She specifically denies chest pain and dyspnea.  She denies palpitations, dizziness, syncope.  She denies orthopnea, PND, and lower extremity edema.  She offers no other complaints or concerns at this time.       Review of Systems:   Review of Systems   Constitutional: Negative for activity change, chills, diaphoresis, fatigue, fever and unexpected weight gain.   Eyes: Negative for blurred vision and visual disturbance.   Respiratory: Negative for apnea, cough, chest tightness, shortness of breath and wheezing.    Cardiovascular: Negative for chest pain, palpitations and leg swelling.   Gastrointestinal: Negative for abdominal distention, blood in stool, GERD and indigestion.   Endocrine: Negative for cold intolerance and heat intolerance.   Genitourinary: Negative for hematuria.   Musculoskeletal: Negative for gait problem, joint swelling and myalgias.   Skin: Negative for color change, pallor and bruise.   Neurological: Negative for dizziness, seizures, syncope, weakness, light-headedness, numbness, headache and confusion.   Hematological: Does not bruise/bleed easily.   Psychiatric/Behavioral:  "Negative for behavioral problems, sleep disturbance, suicidal ideas and depressed mood.        I have reviewed and confirmed the accuracy of the ROS as documented by the MA/LPN/RN NADINE Harp    I have reviewed and/or updated the patient's past medical, past surgical, family, social history, problem list and allergies as appropriate.     Medications:     Current Outpatient Medications:   •  acyclovir (ZOVIRAX) 400 MG tablet, acyclovir 400 mg tablet  TAKE ONE TABLET BY MOUTH TWICE A DAY, Disp: , Rfl:   •  clonazePAM (KlonoPIN) 1 MG tablet, 2 (Two) Times a Day., Disp: , Rfl:   •  coenzyme Q10 100 MG capsule, Take 100 mg by mouth Daily., Disp: , Rfl:   •  FLUoxetine (PROzac) 40 MG capsule, Take 80 mg by mouth Daily., Disp: , Rfl:   •  lisinopril (PRINIVIL,ZESTRIL) 20 MG tablet, Take 20 mg by mouth Daily., Disp: , Rfl:   •  metoprolol succinate XL (TOPROL-XL) 25 MG 24 hr tablet, Take 1 tablet by mouth Every Night for 360 days., Disp: 30 tablet, Rfl: 11  •  Omega-3 Fatty Acids (fish oil) 1000 MG capsule capsule, Take  by mouth Daily With Breakfast., Disp: , Rfl:   •  pantoprazole (PROTONIX) 40 MG EC tablet, Take 40 mg by mouth Daily., Disp: , Rfl:   •  Potassium Gluconate (SM POTASSIUM PO), Take  by mouth., Disp: , Rfl:   •  pravastatin (PRAVACHOL) 40 MG tablet, Take 40 mg by mouth Daily., Disp: , Rfl:   •  Rhubarb (ESTROVEN COMPLETE PO), Take  by mouth Daily., Disp: , Rfl:     Physical Exam:  Vital Signs:   Vitals:    11/15/21 1243   BP: 100/64   BP Location: Right arm   Patient Position: Sitting   Cuff Size: Adult   Pulse: 68   Resp: 18   SpO2: 98%   Weight: 68.9 kg (152 lb)   Height: 149.9 cm (59.02\")     Body mass index is 30.68 kg/m².    Physical Exam  Vitals and nursing note reviewed.   Constitutional:       General: She is not in acute distress.     Appearance: Normal appearance. She is well-developed.   HENT:      Head: Normocephalic and atraumatic.   Eyes:      General: No scleral icterus.     " Extraocular Movements: Extraocular movements intact.   Neck:      Trachea: Trachea normal.   Cardiovascular:      Rate and Rhythm: Normal rate and regular rhythm.      Pulses: Normal pulses.      Heart sounds: Normal heart sounds. No murmur heard.  No friction rub. No gallop.    Pulmonary:      Effort: Pulmonary effort is normal.      Breath sounds: Normal breath sounds. No stridor. No wheezing, rhonchi or rales.   Abdominal:      Palpations: Abdomen is soft.      Tenderness: There is no abdominal tenderness.   Musculoskeletal:         General: Normal range of motion.      Cervical back: Neck supple.      Right lower leg: No edema.      Left lower leg: No edema.   Skin:     General: Skin is warm and dry.      Findings: No bruising, lesion or rash.   Neurological:      Mental Status: She is alert and oriented to person, place, and time.      Motor: No weakness.      Gait: Gait normal.   Psychiatric:         Mood and Affect: Mood normal.         Behavior: Behavior normal. Behavior is cooperative.         Thought Content: Thought content does not include suicidal ideation.         Results Review:   I reviewed the patient's new clinical results.    Assessment / Plan:     1. Essential hypertension (Primary)  --Well-controlled on current antihypertensives    2. Dyslipidemia  --Reported intolerance secondary to myalgias  --LDL goal< 100 mg/dL  --Encouraged low-fat diet  --Continue pravastatin    3. Palpitations  --Patient reports complete resolution of symptoms  --Continue metoprolol as directed    The patient wishes to follow-up with the cardiology clinic on an as-needed basis, which is reasonable.    Preventative Cardiology:   Tobacco Cessation: N/A   Advance Care Planning: ACP discussion was held with the patient during this visit. Patient does not have an advance directive, declines further assistance.     Follow Up:   Return if symptoms worsen or fail to improve, for Follow-up with Dr. Smith.      Thank you for  allowing me to participate in the care of your patient. Please to not hesitate to contact me with additional questions or concerns.     NADINE Sloan

## 2022-02-14 PROCEDURE — U0004 COV-19 TEST NON-CDC HGH THRU: HCPCS | Performed by: NURSE PRACTITIONER

## 2022-07-10 ENCOUNTER — HOSPITAL ENCOUNTER (INPATIENT)
Facility: HOSPITAL | Age: 52
LOS: 3 days | Discharge: HOME | End: 2022-07-13
Attending: EMERGENCY MEDICINE | Admitting: INTERNAL MEDICINE

## 2022-07-10 ENCOUNTER — APPOINTMENT (OUTPATIENT)
Dept: CT IMAGING | Facility: HOSPITAL | Age: 52
End: 2022-07-10

## 2022-07-10 ENCOUNTER — APPOINTMENT (OUTPATIENT)
Dept: GENERAL RADIOLOGY | Facility: HOSPITAL | Age: 52
End: 2022-07-10

## 2022-07-10 DIAGNOSIS — G04.90 ENCEPHALITIS: ICD-10-CM

## 2022-07-10 DIAGNOSIS — E87.1 HYPONATREMIA: ICD-10-CM

## 2022-07-10 DIAGNOSIS — R50.9 FEVER, UNSPECIFIED FEVER CAUSE: Primary | ICD-10-CM

## 2022-07-10 DIAGNOSIS — R41.82 ALTERED MENTAL STATUS, UNSPECIFIED ALTERED MENTAL STATUS TYPE: ICD-10-CM

## 2022-07-10 DIAGNOSIS — H70.91 MASTOIDITIS, RIGHT: ICD-10-CM

## 2022-07-10 DIAGNOSIS — E27.40 ADRENAL INSUFFICIENCY: ICD-10-CM

## 2022-07-10 PROBLEM — G93.41 METABOLIC ENCEPHALOPATHY: Status: ACTIVE | Noted: 2022-07-10

## 2022-07-10 LAB
ALBUMIN SERPL-MCNC: 4 G/DL (ref 3.5–5.2)
ALBUMIN/GLOB SERPL: 1.3 G/DL
ALP SERPL-CCNC: 84 U/L (ref 39–117)
ALT SERPL W P-5'-P-CCNC: 15 U/L (ref 1–33)
AMPHET+METHAMPHET UR QL: NEGATIVE
AMPHETAMINES UR QL: NEGATIVE
ANION GAP SERPL CALCULATED.3IONS-SCNC: 11 MMOL/L (ref 5–15)
APAP SERPL-MCNC: <5 MCG/ML (ref 0–30)
AST SERPL-CCNC: 22 U/L (ref 1–32)
B PARAPERT DNA SPEC QL NAA+PROBE: NOT DETECTED
B PERT DNA SPEC QL NAA+PROBE: NOT DETECTED
BARBITURATES UR QL SCN: NEGATIVE
BASOPHILS # BLD AUTO: 0.02 10*3/MM3 (ref 0–0.2)
BASOPHILS NFR BLD AUTO: 0.5 % (ref 0–1.5)
BENZODIAZ UR QL SCN: POSITIVE
BILIRUB SERPL-MCNC: 0.2 MG/DL (ref 0–1.2)
BILIRUB UR QL STRIP: NEGATIVE
BUN SERPL-MCNC: 15 MG/DL (ref 6–20)
BUN/CREAT SERPL: 12.8 (ref 7–25)
BUPRENORPHINE SERPL-MCNC: NEGATIVE NG/ML
C PNEUM DNA NPH QL NAA+NON-PROBE: NOT DETECTED
CALCIUM SPEC-SCNC: 8.8 MG/DL (ref 8.6–10.5)
CANNABINOIDS SERPL QL: NEGATIVE
CHLORIDE SERPL-SCNC: 93 MMOL/L (ref 98–107)
CLARITY UR: CLEAR
CO2 SERPL-SCNC: 22 MMOL/L (ref 22–29)
COCAINE UR QL: NEGATIVE
COLOR UR: YELLOW
CREAT SERPL-MCNC: 1.17 MG/DL (ref 0.57–1)
D-LACTATE SERPL-SCNC: 1 MMOL/L (ref 0.5–2)
DEPRECATED RDW RBC AUTO: 38.7 FL (ref 37–54)
EGFRCR SERPLBLD CKD-EPI 2021: 56.3 ML/MIN/1.73
EOSINOPHIL # BLD AUTO: 0.04 10*3/MM3 (ref 0–0.4)
EOSINOPHIL NFR BLD AUTO: 0.9 % (ref 0.3–6.2)
ERYTHROCYTE [DISTWIDTH] IN BLOOD BY AUTOMATED COUNT: 12.3 % (ref 12.3–15.4)
ETHANOL BLD-MCNC: <10 MG/DL (ref 0–10)
ETHANOL UR QL: <0.01 %
FLUAV SUBTYP SPEC NAA+PROBE: NOT DETECTED
FLUBV RNA ISLT QL NAA+PROBE: NOT DETECTED
GLOBULIN UR ELPH-MCNC: 3 GM/DL
GLUCOSE BLDC GLUCOMTR-MCNC: 83 MG/DL (ref 70–130)
GLUCOSE SERPL-MCNC: 70 MG/DL (ref 65–99)
GLUCOSE UR STRIP-MCNC: NEGATIVE MG/DL
HADV DNA SPEC NAA+PROBE: NOT DETECTED
HCOV 229E RNA SPEC QL NAA+PROBE: NOT DETECTED
HCOV HKU1 RNA SPEC QL NAA+PROBE: NOT DETECTED
HCOV NL63 RNA SPEC QL NAA+PROBE: NOT DETECTED
HCOV OC43 RNA SPEC QL NAA+PROBE: NOT DETECTED
HCT VFR BLD AUTO: 31.8 % (ref 34–46.6)
HGB BLD-MCNC: 11.1 G/DL (ref 12–15.9)
HGB UR QL STRIP.AUTO: NEGATIVE
HMPV RNA NPH QL NAA+NON-PROBE: NOT DETECTED
HPIV1 RNA ISLT QL NAA+PROBE: NOT DETECTED
HPIV2 RNA SPEC QL NAA+PROBE: NOT DETECTED
HPIV3 RNA NPH QL NAA+PROBE: NOT DETECTED
HPIV4 P GENE NPH QL NAA+PROBE: NOT DETECTED
IMM GRANULOCYTES # BLD AUTO: 0.01 10*3/MM3 (ref 0–0.05)
IMM GRANULOCYTES NFR BLD AUTO: 0.2 % (ref 0–0.5)
KETONES UR QL STRIP: NEGATIVE
LEUKOCYTE ESTERASE UR QL STRIP.AUTO: NEGATIVE
LIPASE SERPL-CCNC: 45 U/L (ref 13–60)
LYMPHOCYTES # BLD AUTO: 0.5 10*3/MM3 (ref 0.7–3.1)
LYMPHOCYTES NFR BLD AUTO: 11.6 % (ref 19.6–45.3)
M PNEUMO IGG SER IA-ACNC: NOT DETECTED
MAGNESIUM SERPL-MCNC: 1.6 MG/DL (ref 1.6–2.6)
MCH RBC QN AUTO: 29.9 PG (ref 26.6–33)
MCHC RBC AUTO-ENTMCNC: 34.9 G/DL (ref 31.5–35.7)
MCV RBC AUTO: 85.7 FL (ref 79–97)
METHADONE UR QL SCN: NEGATIVE
MONOCYTES # BLD AUTO: 0.34 10*3/MM3 (ref 0.1–0.9)
MONOCYTES NFR BLD AUTO: 7.9 % (ref 5–12)
NEUTROPHILS NFR BLD AUTO: 3.41 10*3/MM3 (ref 1.7–7)
NEUTROPHILS NFR BLD AUTO: 78.9 % (ref 42.7–76)
NITRITE UR QL STRIP: NEGATIVE
NRBC BLD AUTO-RTO: 0 /100 WBC (ref 0–0.2)
OPIATES UR QL: NEGATIVE
OXYCODONE UR QL SCN: NEGATIVE
PCP UR QL SCN: NEGATIVE
PH UR STRIP.AUTO: 5.5 [PH] (ref 5–8)
PLATELET # BLD AUTO: 152 10*3/MM3 (ref 140–450)
PMV BLD AUTO: 9 FL (ref 6–12)
POTASSIUM SERPL-SCNC: 4.5 MMOL/L (ref 3.5–5.2)
PROCALCITONIN SERPL-MCNC: 0.21 NG/ML (ref 0–0.25)
PROPOXYPH UR QL: NEGATIVE
PROT SERPL-MCNC: 7 G/DL (ref 6–8.5)
PROT UR QL STRIP: NEGATIVE
RBC # BLD AUTO: 3.71 10*6/MM3 (ref 3.77–5.28)
RHINOVIRUS RNA SPEC NAA+PROBE: NOT DETECTED
RSV RNA NPH QL NAA+NON-PROBE: NOT DETECTED
SALICYLATES SERPL-MCNC: 0.8 MG/DL
SARS-COV-2 RNA NPH QL NAA+NON-PROBE: NOT DETECTED
SODIUM SERPL-SCNC: 126 MMOL/L (ref 136–145)
SP GR UR STRIP: <=1.005 (ref 1–1.03)
TRICYCLICS UR QL SCN: POSITIVE
TROPONIN T SERPL-MCNC: <0.01 NG/ML (ref 0–0.03)
UROBILINOGEN UR QL STRIP: NORMAL
WBC NRBC COR # BLD: 4.32 10*3/MM3 (ref 3.4–10.8)

## 2022-07-10 PROCEDURE — 25010000002 VANCOMYCIN 5 G RECONSTITUTED SOLUTION: Performed by: PHYSICIAN ASSISTANT

## 2022-07-10 PROCEDURE — 82077 ASSAY SPEC XCP UR&BREATH IA: CPT | Performed by: PHYSICIAN ASSISTANT

## 2022-07-10 PROCEDURE — 81003 URINALYSIS AUTO W/O SCOPE: CPT | Performed by: PHYSICIAN ASSISTANT

## 2022-07-10 PROCEDURE — 83605 ASSAY OF LACTIC ACID: CPT | Performed by: PHYSICIAN ASSISTANT

## 2022-07-10 PROCEDURE — 85025 COMPLETE CBC W/AUTO DIFF WBC: CPT | Performed by: PHYSICIAN ASSISTANT

## 2022-07-10 PROCEDURE — 84484 ASSAY OF TROPONIN QUANT: CPT | Performed by: PHYSICIAN ASSISTANT

## 2022-07-10 PROCEDURE — 99222 1ST HOSP IP/OBS MODERATE 55: CPT | Performed by: NURSE PRACTITIONER

## 2022-07-10 PROCEDURE — 80143 DRUG ASSAY ACETAMINOPHEN: CPT | Performed by: PHYSICIAN ASSISTANT

## 2022-07-10 PROCEDURE — 83690 ASSAY OF LIPASE: CPT | Performed by: PHYSICIAN ASSISTANT

## 2022-07-10 PROCEDURE — 80306 DRUG TEST PRSMV INSTRMNT: CPT | Performed by: PHYSICIAN ASSISTANT

## 2022-07-10 PROCEDURE — 36415 COLL VENOUS BLD VENIPUNCTURE: CPT

## 2022-07-10 PROCEDURE — 99284 EMERGENCY DEPT VISIT MOD MDM: CPT

## 2022-07-10 PROCEDURE — 25010000002 ACYCLOVIR PER 5 MG: Performed by: PHYSICIAN ASSISTANT

## 2022-07-10 PROCEDURE — 83735 ASSAY OF MAGNESIUM: CPT | Performed by: PHYSICIAN ASSISTANT

## 2022-07-10 PROCEDURE — 84145 PROCALCITONIN (PCT): CPT | Performed by: PHYSICIAN ASSISTANT

## 2022-07-10 PROCEDURE — 82962 GLUCOSE BLOOD TEST: CPT

## 2022-07-10 PROCEDURE — 25010000002 ONDANSETRON PER 1 MG: Performed by: PHYSICIAN ASSISTANT

## 2022-07-10 PROCEDURE — 25010000002 PIPERACILLIN SOD-TAZOBACTAM PER 1 G: Performed by: PHYSICIAN ASSISTANT

## 2022-07-10 PROCEDURE — 0202U NFCT DS 22 TRGT SARS-COV-2: CPT | Performed by: PHYSICIAN ASSISTANT

## 2022-07-10 PROCEDURE — 25010000002 ONDANSETRON PER 1 MG: Performed by: NURSE PRACTITIONER

## 2022-07-10 PROCEDURE — 25010000002 PIPERACILLIN SOD-TAZOBACTAM PER 1 G: Performed by: NURSE PRACTITIONER

## 2022-07-10 PROCEDURE — 00JU3ZZ INSPECTION OF SPINAL CANAL, PERCUTANEOUS APPROACH: ICD-10-PCS | Performed by: EMERGENCY MEDICINE

## 2022-07-10 PROCEDURE — 71045 X-RAY EXAM CHEST 1 VIEW: CPT

## 2022-07-10 PROCEDURE — 70450 CT HEAD/BRAIN W/O DYE: CPT

## 2022-07-10 PROCEDURE — 87040 BLOOD CULTURE FOR BACTERIA: CPT | Performed by: PHYSICIAN ASSISTANT

## 2022-07-10 PROCEDURE — 80053 COMPREHEN METABOLIC PANEL: CPT | Performed by: PHYSICIAN ASSISTANT

## 2022-07-10 PROCEDURE — 80179 DRUG ASSAY SALICYLATE: CPT | Performed by: PHYSICIAN ASSISTANT

## 2022-07-10 RX ORDER — IBUPROFEN 200 MG
400 TABLET ORAL ONCE
Status: COMPLETED | OUTPATIENT
Start: 2022-07-10 | End: 2022-07-10

## 2022-07-10 RX ORDER — ACETAMINOPHEN 500 MG
1000 TABLET ORAL ONCE
Status: COMPLETED | OUTPATIENT
Start: 2022-07-10 | End: 2022-07-10

## 2022-07-10 RX ORDER — ONDANSETRON 2 MG/ML
4 INJECTION INTRAMUSCULAR; INTRAVENOUS ONCE
Status: COMPLETED | OUTPATIENT
Start: 2022-07-10 | End: 2022-07-10

## 2022-07-10 RX ORDER — ACETAMINOPHEN 325 MG/1
650 TABLET ORAL EVERY 4 HOURS PRN
Status: DISCONTINUED | OUTPATIENT
Start: 2022-07-10 | End: 2022-07-13 | Stop reason: HOSPADM

## 2022-07-10 RX ORDER — SODIUM CHLORIDE 9 MG/ML
125 INJECTION, SOLUTION INTRAVENOUS CONTINUOUS
Status: DISCONTINUED | OUTPATIENT
Start: 2022-07-10 | End: 2022-07-11

## 2022-07-10 RX ORDER — ENOXAPARIN SODIUM 100 MG/ML
40 INJECTION SUBCUTANEOUS DAILY
Status: DISCONTINUED | OUTPATIENT
Start: 2022-07-10 | End: 2022-07-10

## 2022-07-10 RX ORDER — SODIUM CHLORIDE 0.9 % (FLUSH) 0.9 %
10 SYRINGE (ML) INJECTION EVERY 12 HOURS SCHEDULED
Status: DISCONTINUED | OUTPATIENT
Start: 2022-07-10 | End: 2022-07-13 | Stop reason: HOSPADM

## 2022-07-10 RX ORDER — SODIUM CHLORIDE 0.9 % (FLUSH) 0.9 %
10 SYRINGE (ML) INJECTION AS NEEDED
Status: DISCONTINUED | OUTPATIENT
Start: 2022-07-10 | End: 2022-07-13 | Stop reason: HOSPADM

## 2022-07-10 RX ORDER — ONDANSETRON 4 MG/1
4 TABLET, FILM COATED ORAL EVERY 6 HOURS PRN
Status: DISCONTINUED | OUTPATIENT
Start: 2022-07-10 | End: 2022-07-13 | Stop reason: HOSPADM

## 2022-07-10 RX ORDER — DEXTROSE MONOHYDRATE 25 G/50ML
50 INJECTION, SOLUTION INTRAVENOUS
Status: DISCONTINUED | OUTPATIENT
Start: 2022-07-10 | End: 2022-07-13 | Stop reason: HOSPADM

## 2022-07-10 RX ORDER — ONDANSETRON 2 MG/ML
4 INJECTION INTRAMUSCULAR; INTRAVENOUS EVERY 6 HOURS PRN
Status: DISCONTINUED | OUTPATIENT
Start: 2022-07-10 | End: 2022-07-13 | Stop reason: HOSPADM

## 2022-07-10 RX ORDER — ALPRAZOLAM 1 MG/1
1 TABLET ORAL 2 TIMES DAILY
COMMUNITY

## 2022-07-10 RX ORDER — ENOXAPARIN SODIUM 100 MG/ML
40 INJECTION SUBCUTANEOUS DAILY
Status: DISCONTINUED | OUTPATIENT
Start: 2022-07-11 | End: 2022-07-10

## 2022-07-10 RX ADMIN — SODIUM CHLORIDE 75 ML/HR: 9 INJECTION, SOLUTION INTRAVENOUS at 18:01

## 2022-07-10 RX ADMIN — ACETAMINOPHEN 1000 MG: 500 TABLET ORAL at 12:15

## 2022-07-10 RX ADMIN — ACYCLOVIR SODIUM 580 MG: 50 INJECTION, SOLUTION INTRAVENOUS at 18:01

## 2022-07-10 RX ADMIN — IBUPROFEN 400 MG: 200 TABLET, FILM COATED ORAL at 12:15

## 2022-07-10 RX ADMIN — SODIUM CHLORIDE 1000 ML: 9 INJECTION, SOLUTION INTRAVENOUS at 18:46

## 2022-07-10 RX ADMIN — TAZOBACTAM SODIUM AND PIPERACILLIN SODIUM 3.38 G: 375; 3 INJECTION, SOLUTION INTRAVENOUS at 20:47

## 2022-07-10 RX ADMIN — VANCOMYCIN HYDROCHLORIDE 1500 MG: 5 INJECTION, POWDER, LYOPHILIZED, FOR SOLUTION INTRAVENOUS at 14:54

## 2022-07-10 RX ADMIN — DEXTROSE MONOHYDRATE 50 ML: 25 INJECTION, SOLUTION INTRAVENOUS at 17:17

## 2022-07-10 RX ADMIN — TAZOBACTAM SODIUM AND PIPERACILLIN SODIUM 3.38 G: 375; 3 INJECTION, SOLUTION INTRAVENOUS at 14:54

## 2022-07-10 RX ADMIN — Medication 10 ML: at 20:47

## 2022-07-10 RX ADMIN — SODIUM CHLORIDE 500 ML: 9 INJECTION, SOLUTION INTRAVENOUS at 20:17

## 2022-07-10 RX ADMIN — ONDANSETRON HYDROCHLORIDE 4 MG: 2 INJECTION, SOLUTION INTRAMUSCULAR; INTRAVENOUS at 14:58

## 2022-07-10 RX ADMIN — ONDANSETRON 4 MG: 2 INJECTION INTRAMUSCULAR; INTRAVENOUS at 23:03

## 2022-07-10 RX ADMIN — SODIUM CHLORIDE 1000 ML: 9 INJECTION, SOLUTION INTRAVENOUS at 12:15

## 2022-07-10 NOTE — H&P
[facility]   HISTORY AND PHYSICAL      Name:  Lesly Nicholas   Age:  51 y.o.  Sex:  female  :  1970  MRN:  [id number]   Visit Number:  [id number]  Admission Date:  7/10/2022  Date Of Service:  07/10/22  Primary Care Physician:  Aimee Canela MD    Chief Complaint:     AMS and fever    History Of Presenting Illness:      51-year-old female presented to the emergency department with her mother due to worsening altered mental status as well as fever.  Pertinent past medical history includes depression and anxiety, suicide attempt, self-mutilation, recent seizure 2 months ago with fall for which patient hit her head CT negative at that time, GERD, hypertension, hyperlipidemia, and insomnia.  Patient history obtained per son and mother as well as patient.  History is very scattered.  Per mother patient has been having episodes where she appears altered for the past several months and even years.  Patient states that her  accuses her of misusing drugs.  She is currently prescribed Xanax for which she states she takes as prescribed.  When asked if patient had ever been diagnosed with bipolar disorder she states yes.  Denies any current thoughts of suicidal or homicidal ideations.  States that her history of self-mutilation occurred while she was possessed years ago.  Patient currently lives at home with her  and son.  Denies associated history of drug addiction.  Onset of fever a couple of days ago.  States that she was exposed to an ill child recently.  Denies associated cough, dysuria, rash, or shortness of air.  Mother also stated that patient's altered mental status began during menopause and resolved after an ablation.  Mother states that patient does have a history of seizure disorder in the past however had not had recent seizure until fall recently.  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 States also taking magnesium and Calcium supplements  which seem to cause worsening confusion.    Upon ED presentation patient noted to have temperature of 103 with other vital signs stable.  Labs mostly unremarkable with urinalysis, chest x-ray, respiratory panel, CBC, within normal limits.  Sodium noted to be 126 with mild elevation of creatinine at 1.1.  Acetaminophen and salicylate levels negative.  UDS positive for benzodiazepines and tricyclic antidepressants.  Ethanol level negative.  CT of head revealed right mastoiditis.  Blood cultures x2 pending.  Procalcitonin and lactate negative.  LP was attempted in the emergency department however was unsuccessful.  Patient was initiated on acyclovir, vancomycin, and Zosyn in the emergency department.  Fever improved with Tylenol and ibuprofen administration.  Prior to patient arriving to floor hypotension noted.  Per RN staff patient somnolent at times with nasal cannula applied.  Hospitalist contacted for further medical management.    Review Of Systems:    All systems were reviewed and negative except as mentioned in history of presenting illness, assessment and plan.    Past Medical History: Patient  has a past medical history of Anxiety, Arthritis, Depression, GERD (gastroesophageal reflux disease), Hyperlipemia, Hypertension, IBS (irritable bowel syndrome), Kidney disease, Kidney stone, Menometrorrhagia (2011), Osteoporosis, Reflux esophagitis, and Urinary tract infection.    Past Surgical History: Patient  has a past surgical history that includes d & c hysteroscopy endometrial ablation (12/29/2011); Gallbladder surgery; [place] tooth extraction; Cholecystectomy; Tubal ligation; and Colonoscopy (N/A, 6/7/2021).    Social History: Patient  reports that she has quit smoking. She has never used smokeless tobacco. She reports that she does not drink alcohol and does not use drugs.    Family History: Patient's family history includes Coronary artery disease in her paternal uncle; Heart attack in her paternal uncle;  Hypertension in her father and mother; Kidney disease in her father; Osteoporosis in her paternal grandmother.    Allergies:      Seroquel [quetiapine fumarate], Trazodone and nefazodone, Wasp venom, and Atorvastatin    Home Medications:    Prior to Admission Medications     Prescriptions Last Dose Informant Patient Reported? Taking?    acyclovir (ZOVIRAX) 400 MG tablet   Yes No    acyclovir 400 mg tablet   TAKE ONE TABLET BY MOUTH TWICE A DAY    albuterol sulfate  (90 Base) MCG/ACT inhaler   No No    Inhale 2 puffs Every 6 (Six) Hours As Needed for Wheezing or Shortness of Air.    clonazePAM (KlonoPIN) 1 MG tablet   Yes No    2 (Two) Times a Day.    FLUoxetine (PROzac) 40 MG capsule   Yes No    Take 80 mg by mouth Daily.    lisinopril (PRINIVIL,ZESTRIL) 20 MG tablet   Yes No    Take 20 mg by mouth Daily.    metoprolol succinate XL (TOPROL-XL) 25 MG 24 hr tablet   No No    Take 1 tablet by mouth Every Night for 360 days.    Omega-3 Fatty Acids (fish oil) 1000 MG capsule capsule   Yes No    Take  by mouth Daily With Breakfast.    pantoprazole (PROTONIX) 40 MG EC tablet   Yes No    Take 40 mg by mouth Daily.    Potassium Gluconate (SM POTASSIUM PO)   Yes No    Take  by mouth.        ED Medications:    Medications   acyclovir (ZOVIRAX) 580 mg in sodium chloride 0.9 % 100 mL IVPB (has no administration in time range)   dextrose (D50W) (25 g/50 mL) IV injection 50 mL (50 mL Intravenous Given 7/10/22 1717)   acetaminophen (TYLENOL) tablet 1,000 mg (1,000 mg Oral Given 7/10/22 1215)   ibuprofen (ADVIL,MOTRIN) tablet 400 mg (400 mg Oral Given 7/10/22 1215)   sodium chloride 0.9 % bolus 1,000 mL (0 mL Intravenous Stopped 7/10/22 1458)   vancomycin 1500 mg/500 mL 0.9% NS IVPB ([facility]) (0 mg Intravenous Stopped 7/10/22 1708)   piperacillin-tazobactam (ZOSYN) 3.375 g in iso-osmotic dextrose 50 ml (premix) (0 g Intravenous Stopped 7/10/22 1503)   ondansetron (ZOFRAN) injection 4 mg (4 mg Intravenous Given 7/10/22 1458)  "    Vital Signs:  Temp:  [98.3 °F (36.8 °C)-103.1 °F (39.5 °C)] 98.3 °F (36.8 °C)  Heart Rate:  [] 78  Resp:  [18] 18  BP: (109-146)/(60-92) 110/74        07/10/22  1150   Weight: 72.6 kg (160 lb)     Body mass index is 32.32 kg/m².    Physical Exam:     Most recent vital Signs: /74   Pulse 78   Temp 98.3 °F (36.8 °C)   Resp 18   Ht 149.9 cm (59\")   Wt 72.6 kg (160 lb)   SpO2 96%   BMI 32.32 kg/m²     Physical Exam  Vitals and nursing note reviewed.   Constitutional:       Comments: Drowsy.  Appears intoxicated.   HENT:      Head: Normocephalic.      Right Ear: External ear normal.      Left Ear: External ear normal.      Ears:      Comments: No pain with palpation to mastoid area.     Nose: Nose normal.      Mouth/Throat:      Mouth: Mucous membranes are moist.   Eyes:      Pupils: Pupils are equal, round, and reactive to light.   Cardiovascular:      Rate and Rhythm: Normal rate and regular rhythm.      Pulses: Normal pulses.      Heart sounds: Normal heart sounds.   Pulmonary:      Effort: Pulmonary effort is normal.      Breath sounds: Normal breath sounds.   Abdominal:      General: Bowel sounds are normal.      Palpations: Abdomen is soft.   Musculoskeletal:         General: Normal range of motion.      Cervical back: Normal range of motion and neck supple. No rigidity or tenderness.      Right lower leg: No edema.      Left lower leg: No edema.   Skin:     General: Skin is warm.      Capillary Refill: Capillary refill takes less than 2 seconds.      Comments: Scarring noted bilateral forearms.   Neurological:      General: No focal deficit present.      Mental Status: She is alert. She is disoriented.   Psychiatric:      Comments: Euphoric mood noted.  Abnormal judgment and thought content.         Laboratory data:    I have reviewed the labs done in the emergency room.    Results from last 7 days   Lab Units 07/10/22  1209   SODIUM mmol/L 126*   POTASSIUM mmol/L 4.5   CHLORIDE mmol/L 93* "   CO2 mmol/L 22.0   BUN mg/dL 15   CREATININE mg/dL 1.17*   CALCIUM mg/dL 8.8   BILIRUBIN mg/dL 0.2   ALK PHOS U/L 84   ALT (SGPT) U/L 15   AST (SGOT) U/L 22   GLUCOSE mg/dL 70     Results from last 7 days   Lab Units 07/10/22  1209   WBC 10*3/mm3 4.32   HEMOGLOBIN g/dL 11.1*   HEMATOCRIT % 31.8*   PLATELETS 10*3/mm3 152         Results from last 7 days   Lab Units 07/10/22  1209   TROPONIN T ng/mL <0.010             Results from last 7 days   Lab Units 07/10/22  1209   LIPASE U/L 45         Results from last 7 days   Lab Units 07/10/22  1500   COLOR UA  Yellow   GLUCOSE UA  Negative   KETONES UA  Negative   LEUKOCYTES UA  Negative   PH, URINE  5.5   BILIRUBIN UA  Negative   UROBILINOGEN UA  0.2 E.U./dL       Pain Management Panel     Pain Management Panel Latest Ref Rng & Units 7/10/2022 7/25/2018    AMPHETAMINES SCREEN, URINE Negative Negative Negative    BARBITURATES SCREEN Negative Negative Negative    BENZODIAZEPINE SCREEN, URINE Negative Positive(A) Negative    BUPRENORPHINEUR Negative Negative Negative    COCAINE SCREEN, URINE Negative Negative Negative    METHADONE SCREEN, URINE Negative Negative Negative    METHAMPHETAMINEUR Negative Negative Negative          EKG:      Sinus tach 107 BPM    Radiology:    CT Head Without Contrast    Result Date: 7/10/2022  PROCEDURE: CT HEAD WO CONTRAST-  INDICATION: Altered mental status, fever  TECHNIQUE: Multiple axial CT images were performed from the foramen magnum to the vertex without contrast.   Coronal reconstruction images were obtained from the axial data.  COMPARISON: None.  FINDINGS: There is no mass effect or midline shift.  The ventricles are symmetric in size and configuration.  There is no hydrocephalus.  There are no extraaxial fluid collections.  There is no intraventricular or intraparenchymal hemorrhage.  The posterior fossa has a normal CT appearance.  There is fluid in the right mastoid air cells. There is no air-fluid level in the paranasal sinuses.  No acute osseous abnormalities are present.      No mass effect, midline shift, or intracranial hemorrhage..  Right mastoiditis.       705.80 mGy.cm    This study was performed with techniques to keep radiation doses as low as reasonably achievable (ALARA). Individualized dose reduction techniques using automated exposure control or adjustment of mA and/or kV according to the patient size were employed.  This report was signed and finalized on 7/10/2022 1:40 PM by Radha Chen MD.    XR Chest 1 View    Result Date: 7/10/2022  PROCEDURE: XR CHEST 1 VW-  INDICATION:  AMS  FINDINGS:  A portable view of the chest was obtained.  Comparison is made to a prior exam dated 07/25/2018.   The cardiac and mediastinal silhouettes are within normal limits.  The lungs are clear.  There is no pleural effusion or pneumothorax.  No acute osseous abnormality is identified.       No acute process on this portable exam.  This report was signed and finalized on 7/10/2022 12:21 PM by Radha Chen MD.      Assessment:    1. Sepsis of unknown origin, POA  2. Altered Mental Status, POA  3. Hyponatremia, POA  4. Hypoglycemia  5. History of Severe Depression with Questionable Bipolar disorder  6. GERD  7. HTN  8. HLD       Plan:    Sepsis of unknown origin, POA/Hyponatremia/Hypoglycemia  - Negative procal/lactate/WBC- however hypotensive with fever   - Blood cultures pending x 2  - Continue Vanc/Zosyn/Acyclovir- de-escalate as able  - Consult Anesthesiology for LP   -Cardiac Diet  - Monitor FSBS Q 6  - IV fluids    Altered Mental Status, POA- Acute on Chronic  - Questionable misuse of benzodiazepines vs underlying undiagnosed Psych disorder  - Hold all sedating medications at this time  - Will consider referral to Behavioral Health once infectious etiology ruled out    Continue home medications as appropriate.    Risk Assessment: high  DVT Prophylaxis: lovenox  Code Status: full  Diet: cardiac    Advance Care Planning   ACP discussion was  declined by the patient. Patient does not have an advance directive, declines further assistance.       Fransisca Marino, APRN  07/10/22  17:18 EDT    Dictated utilizing Dragon dictation.

## 2022-07-10 NOTE — PROGRESS NOTES
Pharmacy Consult-Vancomycin Dosing    Lesly Nicholas is a  51 y.o. female receiving vancomycin therapy.     Indication: Sepsis  Consulting Provider: Ned    Goal AUC: 400-600 mg/L*hr    Current Antimicrobial Therapy  Anti-Infectives (From admission, onward)      Ordered     Dose/Rate Route Frequency Start Stop    07/10/22 1909  vancomycin 1500 mg/500 mL 0.9% NS IVPB ([facility])        Ordering Provider: Fransisca Marino APRN    1,500 mg  over 90 Minutes Intravenous Every 24 Hours 07/11/22 0800 07/16/22 0759    07/10/22 1835  acyclovir (ZOVIRAX) 580 mg in sodium chloride 0.9 % 100 mL IVPB        Ordering Provider: Fransisca Marino APRN    10 mg/kg × 58.3 kg (Adjusted)  over 60 Minutes Intravenous Every 8 Hours 07/11/22 0200 07/25/22 0159    07/10/22 1840  piperacillin-tazobactam (ZOSYN) 3.375 g in iso-osmotic dextrose 50 ml (premix)        Ordering Provider: Fransisca Marino APRN    3.375 g  over 4 Hours Intravenous Every 8 Hours 07/10/22 2100 07/15/22 2059    07/10/22 1633  acyclovir (ZOVIRAX) 580 mg in sodium chloride 0.9 % 100 mL IVPB        Ordering Provider: Sierra Mensah PA-C    10 mg/kg × 58.3 kg (Adjusted)  over 60 Minutes Intravenous Every 8 Hours 07/10/22 1634 07/10/22 1901    07/10/22 1359  vancomycin 1500 mg/500 mL 0.9% NS IVPB ([facility])        Ordering Provider: Sierra Mensah, PA-C    1,500 mg Intravenous Once 07/10/22 1401 07/10/22 1708    07/10/22 1359  piperacillin-tazobactam (ZOSYN) 3.375 g in iso-osmotic dextrose 50 ml (premix)        Ordering Provider: Sierra Mensah P, PA-C    3.375 g  over 30 Minutes Intravenous Once 07/10/22 1401 07/10/22 1503            Labs  Results from last 7 days   Lab Units 07/10/22  1209   WBC 10*3/mm3 4.32   CREATININE mg/dL 1.17*      Estimated Creatinine Clearance: 51.8 mL/min (A) (by C-G formula based on SCr of 1.17 mg/dL (H)).  Temp Readings from Last 1 Encounters:   07/10/22 97.7 °F (36.5 °C) (Oral)       Microbiology Culture results  Microbiology Results (last 10  "days)       Procedure Component Value - Date/Time    Respiratory Panel PCR w/COVID-19(SARS-CoV-2) ELISHA/DEJA/MELINDA/PAD/COR/MAD/STEPHY In-House, NP Swab in UTM/VTM, 3-4 HR TAT - Swab, Nasopharynx [[id number]]  (Normal) Collected: 07/10/22 1209    Lab Status: Final result Specimen: Swab from Nasopharynx Updated: 07/10/22 1310     ADENOVIRUS, PCR Not Detected     Coronavirus 229E Not Detected     Coronavirus HKU1 Not Detected     Coronavirus NL63 Not Detected     Coronavirus OC43 Not Detected     COVID19 Not Detected     Human Metapneumovirus Not Detected     Human Rhinovirus/Enterovirus Not Detected     Influenza A PCR Not Detected     Influenza B PCR Not Detected     Parainfluenza Virus 1 Not Detected     Parainfluenza Virus 2 Not Detected     Parainfluenza Virus 3 Not Detected     Parainfluenza Virus 4 Not Detected     RSV, PCR Not Detected     Bordetella pertussis pcr Not Detected     Bordetella parapertussis PCR Not Detected     Chlamydophila pneumoniae PCR Not Detected     Mycoplasma pneumo by PCR Not Detected    Narrative:      In the setting of a positive respiratory panel with a viral infection PLUS a negative procalcitonin without other underlying concern for bacterial infection, consider observing off antibiotics or discontinuation of antibiotics and continue supportive care. If the respiratory panel is positive for atypical bacterial infection (Bordetella pertussis, Chlamydophila pneumoniae, or Mycoplasma pneumoniae), consider antibiotic de-escalation to target atypical bacterial infection.            Evaluation of Dosing     Last Dose Received in the ED/Outside Facility: Yes  Is Patient on Dialysis or Renal Replacement: No    Ht - 149.9 cm (59\")  Wt - 72.6 kg (160 lb)    Evaluation of Level                      [employer] AUC Calculation    Current AUC:   New start    Predicted Steady State AUC on Current Dose: New start  _________________________________    Predicted Steady State AUC on New Dose:   540 " mg/L*hr    Assessment/Plan    Pharmacy to dose vancomycin for sepsis. Goal -600 mg/L*hr.  Patient received loading dose of vancomycin 1500 mg (~20.7 mg/kg) IV on 7/10 @ 1454. Initiate maintenance dose of vancomycin 1500mg (~20.7 mg/kg) IV Q24hr on 7/11 @ 0800.  Assess clearance by vancomycin random level on 7/12 @ 0600.  Pharmacy will continue to monitor renal function, cultures and sensitivities, and clinical status to adjust regimen as necessary.    Thank you for the consult,    Vicente Pratt PharmD, BCPS   07/10/22 19:10 EDT

## 2022-07-10 NOTE — PLAN OF CARE
Goal Outcome Evaluation:  Plan of Care Reviewed With: patient, spouse      New admitt  Progress: no change   Interventions implemented as appropriate.

## 2022-07-10 NOTE — ED NOTES
Pt was given 2 liters of fluids bolused 1 per ER Provider and 1 Per Fransisca Hospitalist provider

## 2022-07-10 NOTE — ED PROVIDER NOTES
"Subjective   History of Present Illness   Patient is a 51-year-old female with history of anxiety, arthritis, depression, GERD, hyperlipidemia, hypertension, irritable bowel syndrome, kidney stones, among other comorbidities presenting to the ER with complaints of fever and altered mental status.  Patient's son is at bedside who states that the patient has been loopy and speaking out of her head.  He states that she has been like this before during menopause and patient states that she \"had it all removed\" to help with this.  Patient's son states that she is acting abnormal.  He states that she has had symptoms for the last few days but states that she was more loopy today.  Patient states that she does take benzos, Klonopin is listed on her medication list.  Patient son states that she does take a handful of medications which could be contributing to symptoms.  Patient states that she does have a recent sick contact, states that she has been around a little kid who has been sick.  She does appear to be altered, originally thought that JaminIrwin was president and it was October.  She is oriented to person, place, and year and was eventually able to tell me that it is July and that Nasima was president.    Patient's mother arrived and provided more history. Reports patient has had episodes of being altered during menopause that resolved after an ablation. Reports she was fine after that until a couple months ago when she had a seizure and fell and hit her head while she was in [place]. She states she had h/o seizures but prior to that one, had not had one in 10 years. She states the patient has had episodes of being altered since the fall. She states at the time of the fall, she had a head CT that did not show any brain injury. Denies anticoagulant use. She states the patient's  has accused her of being on drugs because of the behavior. She states patient has basically been acting like she is drunk at times but " has not used alcohol. She states that she has been concerned about auto-brewery syndrome.    Review of Systems   Constitutional: Positive for fever.   Gastrointestinal: Positive for nausea and vomiting.   Psychiatric/Behavioral: Positive for confusion.   All other systems reviewed and are negative.      Past Medical History:   Diagnosis Date   • Anxiety    • Arthritis    • Depression    • GERD (gastroesophageal reflux disease)    • Hyperlipemia    • Hypertension    • IBS (irritable bowel syndrome)    • Kidney disease    • Kidney stone    • Menometrorrhagia 2011   • Osteoporosis    • Reflux esophagitis    • Urinary tract infection        Allergies   Allergen Reactions   • Seroquel [Quetiapine Fumarate] Anaphylaxis   • Trazodone And Nefazodone Anaphylaxis   • Wasp Venom Anaphylaxis   • Atorvastatin Myalgia       Past Surgical History:   Procedure Laterality Date   • CHOLECYSTECTOMY     • COLONOSCOPY N/A 6/7/2021    Procedure: COLONOSCOPY;  Surgeon: Anna Vann MD;  Location: [facility] ENDOSCOPY;  Service: Gastroenterology;  Laterality: N/A;   • D & C HYSTEROSCOPY ENDOMETRIAL ABLATION  12/29/2011    NOVASURE ABLATION (DR MILLER)   • GALLBLADDER SURGERY     • TUBAL ABDOMINAL LIGATION     • WISDOM TOOTH EXTRACTION         Family History   Problem Relation Age of Onset   • Hypertension Father    • Kidney disease Father    • Hypertension Mother    • Osteoporosis Paternal Grandmother    • Coronary artery disease Paternal Uncle    • Heart attack Paternal Uncle    • Breast cancer Neg Hx        Social History     Socioeconomic History   • Marital status:    Tobacco Use   • Smoking status: Former Smoker   • Smokeless tobacco: Never Used   Vaping Use   • Vaping Use: Never used   Substance and Sexual Activity   • Alcohol use: No   • Drug use: No   • Sexual activity: Yes     Partners: Male           Objective   Physical Exam  Vitals and nursing note reviewed.   Constitutional:       General: She is not in acute  distress.     Appearance: She is not toxic-appearing.   HENT:      Head: Normocephalic and atraumatic.      Right Ear: Hearing, tympanic membrane, ear canal and external ear normal.      Left Ear: Hearing, tympanic membrane, ear canal and external ear normal.      Nose: Nose normal.   Eyes:      Extraocular Movements: Extraocular movements intact.      Left eye: Normal extraocular motion.   Cardiovascular:      Rate and Rhythm: Tachycardia present.      Heart sounds: Normal heart sounds.   Pulmonary:      Effort: Pulmonary effort is normal.      Breath sounds: Normal breath sounds.   Abdominal:      General: There is no distension.      Palpations: Abdomen is soft.      Tenderness: There is no abdominal tenderness.   Musculoskeletal:         General: Normal range of motion.      Cervical back: Normal range of motion and neck supple.   Skin:     General: Skin is warm and dry.   Neurological:      General: No focal deficit present.      Mental Status: She is alert. She is confused.      Cranial Nerves: No cranial nerve deficit.      Motor: No weakness.      Comments: Oriented to person, place, year. Originally thought it was October and JaminSlayden is president but did eventually correct herself   Psychiatric:      Comments: Patient currently is acting somewhat like she is innebriated; cooperative, following commands but not answering questions appropriately at times and appears dazed         Procedures           ED Course  ED Course as of 07/10/22 1641   Sun Jul 10, 2022   1252 Lactate: 1.0 [AP]   1252 Salicylate: 0.8 [AP]   1252 Acetaminophen: <5.0 [AP]   1252 Ethanol: <10 [AP]   1252 Ethanol %: <0.010 [AP]   1252 Magnesium: 1.6 [AP]   1252 Troponin T: <0.010 [AP]   1252 Lipase: 45 [AP]   1252 WBC: 4.32 [AP]   1252 RBC(!): 3.71 [AP]   1252 Hemoglobin(!): 11.1 [AP]   1252 Hematocrit(!): 31.8 [AP]   1252 Platelets: 152 [AP]   1252 Glucose: 70 [AP]   1252 BUN: 15 [AP]   1252 Creatinine(!): 1.17 [AP]   1253 Sodium(!): 126  [AP]   1253 Potassium: 4.5 [AP]   1253 Chloride(!): 93 [AP]   1253 CO2: 22.0 [AP]   1253 Calcium: 8.8 [AP]   1253 Total Protein: 7.0 [AP]   1253 Albumin: 4.00 [AP]   1253 ALT (SGPT): 15 [AP]   1253 AST (SGOT): 22 [AP]   1253 Alkaline Phosphatase: 84 [AP]   1253 Total Bilirubin: 0.2 [AP]   1253 Globulin: 3.0 [AP]   1253 A/G Ratio: 1.3 [AP]   1253 BUN/Creatinine Ratio: 12.8 [AP]   1253 Anion Gap: 11.0 [AP]   1253 eGFR(!): 56.3 [AP]   1253 Narrative & Impression  PROCEDURE: XR CHEST 1 VW-     INDICATION:  AMS     FINDINGS:  A portable view of the chest was obtained.  Comparison is  made to a prior exam dated 07/25/2018.   The cardiac and mediastinal  silhouettes are within normal limits.  The lungs are clear.  There is no  pleural effusion or pneumothorax.  No acute osseous abnormality is  identified.         IMPRESSION:  No acute process on this portable exam.     This report was signed and finalized on 7/10/2022 12:21 PM by Radha Chen MD.          Specimen Collected: 07/10/22 12:20 Last Resulted: 07/10/22 12:21           [AP]   1322 RVP negative. [AP]   1341 EKG interpreted by me.  Sinus Garcia Am.  Tachycardic.  Rate of 107.  No obvious ST or T wave changes.  Abnormal EKG. [CG]   1354 Narrative & Impression  PROCEDURE: CT HEAD WO CONTRAST-     INDICATION: Altered mental status, fever     TECHNIQUE: Multiple axial CT images were performed from the foramen  magnum to the vertex without contrast.   Coronal reconstruction images  were obtained from the axial data.     COMPARISON: None.     FINDINGS: There is no mass effect or midline shift.  The ventricles are  symmetric in size and configuration.  There is no hydrocephalus.  There  are no extraaxial fluid collections.  There is no intraventricular or  intraparenchymal hemorrhage.  The posterior fossa has a normal CT  appearance.  There is fluid in the right mastoid air cells. There is no  air-fluid level in the paranasal sinuses. No acute osseous abnormalities  are  present.     IMPRESSION:  No mass effect, midline shift, or intracranial hemorrhage..     Right mastoiditis.                    705.80 mGy.cm           This study was performed with techniques to keep radiation doses as low  as reasonably achievable (ALARA). Individualized dose reduction  techniques using automated exposure control or adjustment of mA and/or  kV according to the patient size were employed.      This report was signed and finalized on 7/10/2022 1:40 PM by Radha Chen MD.          Specimen Collected: 07/10/22 13:38         [AP]   1408 Procalcitonin: 0.21 [AP]   1449 Nursing staff reported patient still unable to give urine sample, requesting cath. They plan to perform in and out catheterization on the patient. [AP]   1457 Discussed CT results with patient. Ask her if she has been having pain behind right ear. She states she has had pain there for years. [AP]   1510 Color, UA: Yellow [AP]   1510 Appearance, UA: Clear [AP]   1510 pH, UA: 5.5 [AP]   1510 Specific Gravity, UA: <=1.005 [AP]   1510 Glucose: Negative [AP]   1511 Ketones, UA: Negative [AP]   1511 Bilirubin, UA: Negative [AP]   1511 Blood, UA: Negative [AP]   1511 Protein, UA: Negative [AP]   1511 Nitrite, UA: Negative [AP]   1511 Leukocytes, UA: Negative [AP]   1511 Urobilinogen, UA: 0.2 E.U./dL [AP]   1532 Benzodiazepine Screen, Urine(!): Positive [AP]   1533 Tricyclic Antidepressants Screen(!): Positive [AP]   1538 Spoke with Dr. Romo who states LP is warranted given fever, AMS, mastoiditis on CT, hyponatremia. Recommends calling anesthetist as they are sometimes on call if ER physician is unable to perform. [AP]      ED Course User Index  [AP] Sierra Mensah, ANAMIKA  [CG] Leo Escobedo, DO                                           MDM   Patient was evaluated in the ER for fever and altered mental status.  Patient is tachycardic with fever of 103 upon arrival.  Patient was otherwise hemodynamically stable.  She did appear to be acutely  altered, not answering questions appropriately and appeared dazed.  Was not oriented to month or who the president is originally.  She is following commands and has no focal neurologic deficits on exam.  No ataxia.  Labs were unremarkable with normal lactate, procalcitonin, troponin, liver function, and white blood cell count.  Creatinine very mildly bumped at 1.17.  Labs are notable for hyponatremia with sodium of 126.  Respiratory panel is negative.  Urinalysis is not indicative of an infectious process.  Chest x-ray is unremarkable.  CT of head revealed no mass-effect or large cortical infarct but did reveal right mastoiditis.  Patient's vitals have improved after medications in the ER.  Hospitalist, Dr. Romo, was consulted and requested that an LP be performed.  Dr. Chandler, ED attending physician, attempted to perform LP but it was unsuccessful.  Dr. Romo was contacted again and recommended starting acyclovir.  He graciously accepted the patient for admission for further evaluation and management.    Final diagnoses:   Fever, unspecified fever cause   Altered mental status, unspecified altered mental status type   Mastoiditis, right   Hyponatremia   Encephalitis       ED Disposition  ED Disposition     ED Disposition   Decision to Admit    Condition   --    Comment   Level of Care: Med/Surg [1]   Diagnosis: Fever, unspecified fever cause [[id number]]   Admitting Physician: JACINDA ROMO [[id number]]   Attending Physician: LLOYD CHANDLER [[id number]]   Isolate for COVID?: No [0]   Certification: I Certify That Inpatient Hospital Services Are Medically Necessary For Greater Than 2 Midnights               No follow-up provider specified.       Medication List      No changes were made to your prescriptions during this visit.          Sirera Mensah PA-C  07/10/22 7195

## 2022-07-11 ENCOUNTER — ANESTHESIA EVENT (OUTPATIENT)
Dept: TELEMETRY | Facility: HOSPITAL | Age: 52
End: 2022-07-11

## 2022-07-11 ENCOUNTER — ANESTHESIA (OUTPATIENT)
Dept: TELEMETRY | Facility: HOSPITAL | Age: 52
End: 2022-07-11

## 2022-07-11 LAB
ANION GAP SERPL CALCULATED.3IONS-SCNC: 11.7 MMOL/L (ref 5–15)
APPEARANCE CSF: CLEAR
BASOPHILS # BLD AUTO: 0.01 10*3/MM3 (ref 0–0.2)
BASOPHILS NFR BLD AUTO: 0.3 % (ref 0–1.5)
BUN SERPL-MCNC: 10 MG/DL (ref 6–20)
BUN/CREAT SERPL: 13 (ref 7–25)
CALCIUM SPEC-SCNC: 8 MG/DL (ref 8.6–10.5)
CHLORIDE SERPL-SCNC: 109 MMOL/L (ref 98–107)
CO2 SERPL-SCNC: 19.3 MMOL/L (ref 22–29)
COLOR CSF: ABNORMAL
CREAT SERPL-MCNC: 0.77 MG/DL (ref 0.57–1)
DEPRECATED RDW RBC AUTO: 42 FL (ref 37–54)
EGFRCR SERPLBLD CKD-EPI 2021: 92.9 ML/MIN/1.73
EOSINOPHIL # BLD AUTO: 0.06 10*3/MM3 (ref 0–0.4)
EOSINOPHIL NFR BLD AUTO: 2 % (ref 0.3–6.2)
ERYTHROCYTE [DISTWIDTH] IN BLOOD BY AUTOMATED COUNT: 12.9 % (ref 12.3–15.4)
GLUCOSE BLDC GLUCOMTR-MCNC: 103 MG/DL (ref 70–130)
GLUCOSE BLDC GLUCOMTR-MCNC: 42 MG/DL (ref 70–130)
GLUCOSE BLDC GLUCOMTR-MCNC: 58 MG/DL (ref 70–130)
GLUCOSE BLDC GLUCOMTR-MCNC: 82 MG/DL (ref 70–130)
GLUCOSE BLDC GLUCOMTR-MCNC: 84 MG/DL (ref 70–130)
GLUCOSE BLDC GLUCOMTR-MCNC: 97 MG/DL (ref 70–130)
GLUCOSE CSF-MCNC: 25 MG/DL (ref 40–70)
GLUCOSE SERPL-MCNC: 45 MG/DL (ref 65–99)
GRAM STN SPEC: NORMAL
HCT VFR BLD AUTO: 27.3 % (ref 34–46.6)
HGB BLD-MCNC: 9.2 G/DL (ref 12–15.9)
IMM GRANULOCYTES # BLD AUTO: 0.01 10*3/MM3 (ref 0–0.05)
IMM GRANULOCYTES NFR BLD AUTO: 0.3 % (ref 0–0.5)
LYMPHOCYTES # BLD AUTO: 0.69 10*3/MM3 (ref 0.7–3.1)
LYMPHOCYTES NFR BLD AUTO: 22.7 % (ref 19.6–45.3)
MCH RBC QN AUTO: 30.2 PG (ref 26.6–33)
MCHC RBC AUTO-ENTMCNC: 33.7 G/DL (ref 31.5–35.7)
MCV RBC AUTO: 89.5 FL (ref 79–97)
MONOCYTES # BLD AUTO: 0.37 10*3/MM3 (ref 0.1–0.9)
MONOCYTES NFR BLD AUTO: 12.2 % (ref 5–12)
NEUTROPHILS NFR BLD AUTO: 1.9 10*3/MM3 (ref 1.7–7)
NEUTROPHILS NFR BLD AUTO: 62.5 % (ref 42.7–76)
NRBC BLD AUTO-RTO: 0 /100 WBC (ref 0–0.2)
PLATELET # BLD AUTO: 133 10*3/MM3 (ref 140–450)
PMV BLD AUTO: 9.3 FL (ref 6–12)
POTASSIUM SERPL-SCNC: 3.9 MMOL/L (ref 3.5–5.2)
PROT CSF-MCNC: 47 MG/DL (ref 15–45)
RBC # BLD AUTO: 3.05 10*6/MM3 (ref 3.77–5.28)
RBC # CSF MANUAL: 1007 /MM3 (ref 0–0)
SODIUM SERPL-SCNC: 140 MMOL/L (ref 136–145)
TSH SERPL DL<=0.05 MIU/L-ACNC: 1.86 UIU/ML (ref 0.27–4.2)
TUBE # CSF: 4
VANCOMYCIN SERPL-MCNC: 8 MCG/ML (ref 5–40)
WBC # CSF MANUAL: 0 /MM3 (ref 0–5)
WBC NRBC COR # BLD: 3.04 10*3/MM3 (ref 3.4–10.8)

## 2022-07-11 PROCEDURE — 80202 ASSAY OF VANCOMYCIN: CPT

## 2022-07-11 PROCEDURE — 25010000002 VANCOMYCIN 1 G RECONSTITUTED SOLUTION

## 2022-07-11 PROCEDURE — 84157 ASSAY OF PROTEIN OTHER: CPT | Performed by: PHYSICIAN ASSISTANT

## 2022-07-11 PROCEDURE — 82945 GLUCOSE OTHER FLUID: CPT | Performed by: PHYSICIAN ASSISTANT

## 2022-07-11 PROCEDURE — 82962 GLUCOSE BLOOD TEST: CPT

## 2022-07-11 PROCEDURE — 84443 ASSAY THYROID STIM HORMONE: CPT | Performed by: NURSE PRACTITIONER

## 2022-07-11 PROCEDURE — 80048 BASIC METABOLIC PNL TOTAL CA: CPT | Performed by: NURSE PRACTITIONER

## 2022-07-11 PROCEDURE — 25010000002 ACYCLOVIR PER 5 MG: Performed by: NURSE PRACTITIONER

## 2022-07-11 PROCEDURE — 25010000002 PIPERACILLIN SOD-TAZOBACTAM PER 1 G: Performed by: NURSE PRACTITIONER

## 2022-07-11 PROCEDURE — 85025 COMPLETE CBC W/AUTO DIFF WBC: CPT | Performed by: NURSE PRACTITIONER

## 2022-07-11 PROCEDURE — 89050 BODY FLUID CELL COUNT: CPT | Performed by: PHYSICIAN ASSISTANT

## 2022-07-11 PROCEDURE — 87205 SMEAR GRAM STAIN: CPT | Performed by: PHYSICIAN ASSISTANT

## 2022-07-11 PROCEDURE — 99232 SBSQ HOSP IP/OBS MODERATE 35: CPT | Performed by: NURSE PRACTITIONER

## 2022-07-11 RX ORDER — DEXTROSE AND SODIUM CHLORIDE 5; .9 G/100ML; G/100ML
50 INJECTION, SOLUTION INTRAVENOUS CONTINUOUS
Status: DISCONTINUED | OUTPATIENT
Start: 2022-07-11 | End: 2022-07-12

## 2022-07-11 RX ORDER — LIDOCAINE HYDROCHLORIDE 10 MG/ML
INJECTION, SOLUTION INFILTRATION; PERINEURAL
Status: DISCONTINUED
Start: 2022-07-11 | End: 2022-07-11 | Stop reason: WASHOUT

## 2022-07-11 RX ADMIN — ACETAMINOPHEN 650 MG: 325 TABLET ORAL at 23:03

## 2022-07-11 RX ADMIN — TAZOBACTAM SODIUM AND PIPERACILLIN SODIUM 3.38 G: 375; 3 INJECTION, SOLUTION INTRAVENOUS at 04:52

## 2022-07-11 RX ADMIN — ACYCLOVIR SODIUM 580 MG: 50 INJECTION, SOLUTION INTRAVENOUS at 19:13

## 2022-07-11 RX ADMIN — DEXTROSE AND SODIUM CHLORIDE 50 ML/HR: 5; 900 INJECTION, SOLUTION INTRAVENOUS at 16:41

## 2022-07-11 RX ADMIN — SODIUM CHLORIDE 125 ML/HR: 9 INJECTION, SOLUTION INTRAVENOUS at 12:11

## 2022-07-11 RX ADMIN — SODIUM CHLORIDE 1000 MG: 900 INJECTION, SOLUTION INTRAVENOUS at 20:58

## 2022-07-11 RX ADMIN — TAZOBACTAM SODIUM AND PIPERACILLIN SODIUM 3.38 G: 375; 3 INJECTION, SOLUTION INTRAVENOUS at 20:58

## 2022-07-11 RX ADMIN — ACYCLOVIR SODIUM 580 MG: 50 INJECTION, SOLUTION INTRAVENOUS at 02:38

## 2022-07-11 RX ADMIN — TAZOBACTAM SODIUM AND PIPERACILLIN SODIUM 3.38 G: 375; 3 INJECTION, SOLUTION INTRAVENOUS at 13:53

## 2022-07-11 RX ADMIN — SODIUM CHLORIDE 1000 MG: 900 INJECTION, SOLUTION INTRAVENOUS at 12:11

## 2022-07-11 RX ADMIN — Medication 10 ML: at 20:58

## 2022-07-11 RX ADMIN — ACYCLOVIR SODIUM 580 MG: 50 INJECTION, SOLUTION INTRAVENOUS at 09:38

## 2022-07-11 NOTE — CASE MANAGEMENT/SOCIAL WORK
Discharge Planning Assessment  [facility]     Patient Name: Lesly Nicholas  MRN: [id number]  Today's Date: 2022    Admit Date: 7/10/2022     Discharge Needs Assessment     Row Name 22 1503       Living Environment    People in Home spouse;child(afshan), adult    Name(s) of People in Home Anthony Nicholas adult son; Edgardo Nicholas spouse    Current Living Arrangements home  mobile home    Duration at Residence 18 yrs    Potentially Unsafe Housing Conditions unable to assess    Primary Care Provided by self    Provides Primary Care For no one    Family Caregiver if Needed spouse    Family Caregiver Names Edgardo Nicholas    Quality of Family Relationships helpful    Able to Return to Prior Arrangements yes    Living Arrangement Comments 4-5 steps into home with siderails; no problems with navigation       Resource/Environmental Concerns    Resource/Environmental Concerns none    Transportation Concerns other (see comments)  family provides transportation       Transition Planning    Patient/Family Anticipates Transition to home with family    Patient/Family Anticipated Services at Transition none    Transportation Anticipated family or friend will provide       Discharge Needs Assessment    Readmission Within the Last 30 Days no previous admission in last 30 days    Equipment Currently Used at Home scales    Concerns to be Addressed no discharge needs identified    Anticipated Changes Related to Illness none    Equipment Needed After Discharge none               Discharge Plan     Row Name 22 1513       Plan    Plan CM spoke with pt at bedside regarding DCp. Pt provided consent for DM to speak with mom/Tila Tellez [phone] and son Anthony Nicholas, who are at bedside. Pt verifies her name, , address and phone number. She identifies her mother and her spouse, Edgardo [phone] as emergency contacts. PCP is [facility] listed as Pharmacy in EMR. Pt lives with adult son and spouse in mobile  "home that has 4 steps into entrance. Pt states she functioned independently prior to admission. She has not been allowed to drive recently  due to \"seizure\" 2 weeks ago. She states she does not take sz med. Reports she saw Dr Sanz for neuro in the past and has appt with [facility] neuro provider name Tila next month. Pt son and  transport her to appts. DME includes scales. Pt denies financial concerns and is unable to identify DC needs at this time. Plan is for pt to DC home with  or son transporting her. CM contact info left at BS.              Continued Care and Services - Admitted Since 7/10/2022    Coordination has not been started for this encounter.       Expected Discharge Date and Time     Expected Discharge Date Expected Discharge Time    Jul 13, 2022          Demographic Summary     Row Name 07/11/22 1455       General Information    Admission Type inpatient    Arrived From emergency department    Referral Source admission list    Reason for Consult discharge planning    Preferred Language English       Contact Information    Permission Granted to Share Info With family/designee    Contact Information Comments Edgardo Nicholas/spouse/ [phone]; mom Tila Geoff- [phone]               Functional Status     Row Name 07/11/22 1459       Functional Status    Usual Activity Tolerance good    Current Activity Tolerance moderate       Functional Status, IADL    Medications independent    Meal Preparation independent    Housekeeping independent    Laundry independent    Shopping independent    IADL Comments does not drive due to recent seizure       Employment/    Employment Status unemployed    Current or Previous Occupation not applicable               Psychosocial    No documentation.                Abuse/Neglect    No documentation.                Legal    No documentation.                Substance Abuse    No documentation.                Patient Forms    No documentation.     "               Mirella Churchill, APRN

## 2022-07-11 NOTE — PROGRESS NOTES
[facility]    Name:  Lesly Nicholas   Age:  51 y.o.  Sex:  female  :  1970  MRN:  [id number]   Visit Number:  [id number]  Admission Date:  7/10/2022  Date Of Service:  07/10/22  Primary Care Physician:  Aimee Canela MD    Patient was seen and examined. Pertinent laboratory and radiology data were reviewed.  Called by the nurse that the patient's blood pressure is persistently low.  Patient is very drowsy and snoring when I saw her but was easily arousable.  She was able to answer all the questions appropriately.  She does not seem to be significantly symptomatic with the low blood pressures.  She also has relative bradycardia.  She does not have neck stiffness but does complain of pain in the back of the neck.    Vital signs:    Vital Signs (last 24 hours)        0700  07/10 0659 07/10 0700  07/10 2036   Most Recent      Temp (°F)   97.2 -  103.1     97.2 (36.2) 07/10 1957    Heart Rate   64 -  112     68 07/10 1957    Resp   17 -  18     18 07/10 1957    BP   78/52 -  156/43     93/54 07/10 1957    SpO2 (%)   90 -  100     100 07/10 1957        I do not suspect septic shock at this time.  She likely has some unknown unintentional drug overdose causing her low blood pressures.  She is currently afebrile.  I will give her another fluid bolus of 500 mL with normal saline and increase her IV fluid to 125 mill per hour.  Continue the current antibiotics regimen.  Discussed with nursing staff at the bedside.    Master Romo MD  07/10/22  20:36 EDT    Dictated utilizing Dragon dictation.

## 2022-07-11 NOTE — ANESTHESIA PROCEDURE NOTES
Lumbar Puncture    Pre-sedation assessment completed: 7/11/2022 11:20 AM    Patient reassessed immediately prior to procedure    Patient location during procedure: floor  Stop Time: 7/11/2022 11:21 AM  End time: 7/11/2022 11:36 AM  Indications: Physician request  Staff  CRNA: Geoff Cedeno CRNA  Preanesthetic Checklist  Completed: patient identified, IV checked, site marked, risks and benefits discussed, surgical consent, monitors and equipment checked, pre-op evaluation and timeout performed  Lumbar Puncture Prep  Patient position: sitting  Prep: ChloraPrep  Patient monitoring: blood pressure monitoring, continuous pulse oximetry and EKG  Sterile Tech:cap, gloves, sterile sheet, sterile towels and mask  Post-Assessment  Post-procedure: adhesive bandage applied and site cleaned  Patient Tolerance: patient tolerated the procedure well with no apparent complications  Patient Tolerance:no  Lumbar Pucture  Approach:midline  Lumbar Space: L3-L4 interspace   Needle gauge: 20 G  Needle Type: spinal needle - Quincke tip  Needle length: 3.5 cm  Number of attempts: 1  Fluid appearance:cloudy and blood-tinged  Tubes of fluid: 4.

## 2022-07-11 NOTE — PROGRESS NOTES
Pharmacy Consult-Vancomycin Dosing    Lesly Nicholas is a  51 y.o. female receiving vancomycin therapy.     Indication: Sepsis  Consulting Provider: Fransisca Marino APRN    Goal AUC: 400-600 mg/L*hr    Current Antimicrobial Therapy  Anti-Infectives (From admission, onward)      Ordered     Dose/Rate Route Frequency Start Stop    07/11/22 0826  vancomycin 1 g/250 mL 0.9% NS (vial-mate)        Ordering Provider: Meliton Valera RPH    1,000 mg Intravenous Every 12 Hours Scheduled 07/11/22 0915 07/15/22 0859    07/11/22 0714  Pharmacy to Dose Zosyn        Ordering Provider: Meliton Valera RPH     Does not apply Continuous PRN 07/11/22 0710 07/15/22 0709    07/11/22 0714  Pharmacy to dose vancomycin        Ordering Provider: Meliton Valera RPH     Does not apply Continuous PRN 07/11/22 0709 07/15/22 0708    07/10/22 1835  acyclovir (ZOVIRAX) 580 mg in sodium chloride 0.9 % 100 mL IVPB        Ordering Provider: Fransisca Marino APRN    10 mg/kg × 58.3 kg (Adjusted)  over 60 Minutes Intravenous Every 8 Hours 07/11/22 0200 07/25/22 0159    07/10/22 1840  piperacillin-tazobactam (ZOSYN) 3.375 g in iso-osmotic dextrose 50 ml (premix)        Ordering Provider: Fransisca Marino APRN    3.375 g  over 4 Hours Intravenous Every 8 Hours 07/10/22 2100 07/15/22 2059    07/10/22 1633  acyclovir (ZOVIRAX) 580 mg in sodium chloride 0.9 % 100 mL IVPB        Ordering Provider: Sierra Mensah P, PA-C    10 mg/kg × 58.3 kg (Adjusted)  over 60 Minutes Intravenous Every 8 Hours 07/10/22 1634 07/10/22 1901    07/10/22 1359  vancomycin 1500 mg/500 mL 0.9% NS IVPB ([facility])        Ordering Provider: Julián Mensahyssa P, PA-C    1,500 mg Intravenous Once 07/10/22 1401 07/10/22 1708    07/10/22 1359  piperacillin-tazobactam (ZOSYN) 3.375 g in iso-osmotic dextrose 50 ml (premix)        Ordering Provider: Sierra Mensah PA-C    3.375 g  over 30 Minutes Intravenous Once 07/10/22 1401 07/10/22 1503            Labs  Results from last 7 days   Lab Units  07/11/22  0545 07/10/22  1209   WBC 10*3/mm3 3.04* 4.32   CREATININE mg/dL 0.77 1.17*      Estimated Creatinine Clearance: 81.6 mL/min (by C-G formula based on SCr of 0.77 mg/dL).  Temp Readings from Last 1 Encounters:   07/11/22 97.6 °F (36.4 °C) (Oral)       Microbiology Culture results  Microbiology Results (last 10 days)       Procedure Component Value - Date/Time    Respiratory Panel PCR w/COVID-19(SARS-CoV-2) ELISHA/DEJA/MELINDA/PAD/COR/MAD/STEPHY In-House, NP Swab in UTM/VTM, 3-4 HR TAT - Swab, Nasopharynx [[id number]]  (Normal) Collected: 07/10/22 1209    Lab Status: Final result Specimen: Swab from Nasopharynx Updated: 07/10/22 1310     ADENOVIRUS, PCR Not Detected     Coronavirus 229E Not Detected     Coronavirus HKU1 Not Detected     Coronavirus NL63 Not Detected     Coronavirus OC43 Not Detected     COVID19 Not Detected     Human Metapneumovirus Not Detected     Human Rhinovirus/Enterovirus Not Detected     Influenza A PCR Not Detected     Influenza B PCR Not Detected     Parainfluenza Virus 1 Not Detected     Parainfluenza Virus 2 Not Detected     Parainfluenza Virus 3 Not Detected     Parainfluenza Virus 4 Not Detected     RSV, PCR Not Detected     Bordetella pertussis pcr Not Detected     Bordetella parapertussis PCR Not Detected     Chlamydophila pneumoniae PCR Not Detected     Mycoplasma pneumo by PCR Not Detected    Narrative:      In the setting of a positive respiratory panel with a viral infection PLUS a negative procalcitonin without other underlying concern for bacterial infection, consider observing off antibiotics or discontinuation of antibiotics and continue supportive care. If the respiratory panel is positive for atypical bacterial infection (Bordetella pertussis, Chlamydophila pneumoniae, or Mycoplasma pneumoniae), consider antibiotic de-escalation to target atypical bacterial infection.            Evaluation of Dosing     Last Dose Received in the ED/Outside Facility: Yes  Is Patient on Dialysis  "or Renal Replacement: No    Ht - 149.9 cm (59\")  Wt - 78 kg (171 lb 15.3 oz)    Evaluation of Level    Results from last 7 days   Lab Units 07/11/22  0545   VANCOMYCIN RM mcg/mL 8.00                   [facility] AUC Calculation    Current AUC:   273 mg/L*hr    Predicted Steady State AUC on Current Dose: 359 mg/L*hr  _________________________________    Predicted Steady State AUC on New Dose: 474 mg/L*hr    Assessment/Plan    Pharmacy to dose vancomycin for sepsis. Goal -600 mg/L*hr.  Patient received loading dose of vancomycin 1500 mg x 1 on 7/10 @ 1454 and was ordered vancomycin 1500mg IV q24h.  Vancomycin random level, collected ~ 15 hours following dose, resulted at 8 mcg/mL. Scr dropped to 0.77 mg/dL from 1.17 mg/dL on admission. Current regimen associated with a subtherapeutic predicted AUC at steady state.  Will adjust regimen to vancomycin 1000 mg IV q12h.  Assess clearance by vancomycin random level on 7/12 @ 0600.  Pharmacy will continue to monitor renal function, cultures and sensitivities, and clinical status to adjust regimen as necessary.    Thanks,     Meliton Valera, PharmD  7/11/2022 08:34 EDT  "

## 2022-07-11 NOTE — PLAN OF CARE
Goal Outcome Evaluation:     Pt had bedside lumbar puncture performed. Specimens sent to lab. Pt continued to have hypoglycemia this shift, fluids changed and pt tolerating at this time. Pt ambulated to bathroom during shift.

## 2022-07-11 NOTE — PLAN OF CARE
Goal Outcome Evaluation:  Plan of Care Reviewed With: patient        Progress: improving  Outcome Evaluation: Pt becoming more alert throughout shift, up several times to bathroom, voiding well, speech becoming more clear, afebrile, SBP 90's

## 2022-07-11 NOTE — PROGRESS NOTES
[facility]    Name:  Lesly Nicholas   Age:  51 y.o.  Sex:  female  :  1970  MRN:  [id number]   Visit Number:  [id number]  Admission Date:  7/10/2022  Date Of Service:  22  Primary Care Physician:  Aimee Canela MD     LOS: 1 day :    Chief Complaint:      AMS/Fever    Subjective:    Patient seen and examined with no family at bedside.  Currently alert and oriented x3.  States feeling overall much improved and wanting to go home.  Patient noted to become hypotensive and hypoglycemic throughout the night for which she was given additional fluid boluses with improvement.  Denies hypoglycemia at home however may be contributing to altered mental status.  Advised we will attempt another lumbar puncture today likely.  All questions answered and updated on plan of care.    Hospital Course:    51-year-old female presented to the emergency department with her mother due to worsening altered mental status as well as fever.  Pertinent past medical history includes depression and anxiety, suicide attempt, self-mutilation, recent seizure 2 months ago with fall for which patient hit her head CT negative at that time, GERD, hypertension, hyperlipidemia, and insomnia.  Patient history obtained per son and mother as well as patient.  History is very scattered.  Per mother patient has been having episodes where she appears altered for the past several months and even years.  Patient states that her  accuses her of misusing drugs.  She is currently prescribed Xanax for which she states she takes as prescribed.  When asked if patient had ever been diagnosed with bipolar disorder she states yes.  Denies any current thoughts of suicidal or homicidal ideations.  States that her history of self-mutilation occurred while she was possessed years ago.  Patient currently lives at home with her  and son.  Denies associated history of drug addiction.  Onset of  fever a couple of days ago.  States that she was exposed to an ill child recently.  Denies associated cough, dysuria, rash, or shortness of air.  Mother also stated that patient's altered mental status began during menopause and resolved after an ablation.  Mother states that patient does have a history of seizure disorder in the past however had not had recent seizure until fall recently.  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 States also taking magnesium and Calcium supplements which seem to cause worsening confusion.     Upon ED presentation patient noted to have temperature of 103 with other vital signs stable.  Labs mostly unremarkable with urinalysis, chest x-ray, respiratory panel, CBC, within normal limits.  Sodium noted to be 126 with mild elevation of creatinine at 1.1.  Acetaminophen and salicylate levels negative.  UDS positive for benzodiazepines and tricyclic antidepressants.  Ethanol level negative.  CT of head revealed right mastoiditis.  Blood cultures x2 pending.  Procalcitonin and lactate negative.  LP was attempted in the emergency department however was unsuccessful.  Patient was initiated on acyclovir, vancomycin, and Zosyn in the emergency department.  Fever improved with Tylenol and ibuprofen administration.  Prior to patient arriving to floor hypotension noted.  Per RN staff patient somnolent at times with nasal cannula applied.  Hospitalist contacted for further medical management.  Patient mentation markedly improved.  However, was noted to have several episodes of hypoglycemia throughout the night which may be contributing overall to patient's altered mental status at home.  No past medical history of diabetes noted.  Patient remained afebrile throughout the night.  Blood cultures x2 pending without growth to date.    Review of Systems:     All systems were reviewed and negative except as mentioned in subjective, assessment and plan.    Vital Signs:    Temp:  [97.2 °F (36.2  °C)-103.1 °F (39.5 °C)] 97.6 °F (36.4 °C)  Heart Rate:  [] 77  Resp:  [17-18] 18  BP: ()/(43-92) 99/52    Intake and output:    I/O last 3 completed shifts:  In: 5837 [P.O.:1320; I.V.:2967; IV Piggyback:1550]  Out: 2500 [Urine:2500]  No intake/output data recorded.    Physical Examination:    General Appearance:  Alert and cooperative. Mentation much improved   Head:  Atraumatic and normocephalic.   Eyes: Conjunctivae and sclerae normal, no icterus. No pallor.   Throat: No oral lesions, no thrush, oral mucosa moist.   Neck: Supple, trachea midline, no thyromegaly.   Lungs:   Breath sounds heard bilaterally equally.  No wheezing or crackles. No Pleural rub or bronchial breathing.   Heart:  Normal S1 and S2, no murmur, no gallop, no rub. No JVD.   Abdomen:   Normal bowel sounds, no masses, no organomegaly. Soft, nontender, nondistended, no rebound tenderness.   Extremities: Supple, no edema, no cyanosis, no clubbing.   Skin: No bleeding or rash. Scarring noted bilateral forearms.   Neurologic: Alert and oriented x 3. No facial asymmetry. Moves all four limbs. No tremors.      Laboratory results:    Results from last 7 days   Lab Units 07/11/22  0545 07/10/22  1209   SODIUM mmol/L 140 126*   POTASSIUM mmol/L 3.9 4.5   CHLORIDE mmol/L 109* 93*   CO2 mmol/L 19.3* 22.0   BUN mg/dL 10 15   CREATININE mg/dL 0.77 1.17*   CALCIUM mg/dL 8.0* 8.8   BILIRUBIN mg/dL  --  0.2   ALK PHOS U/L  --  84   ALT (SGPT) U/L  --  15   AST (SGOT) U/L  --  22   GLUCOSE mg/dL 45* 70     Results from last 7 days   Lab Units 07/11/22  0545 07/10/22  1209   WBC 10*3/mm3 3.04* 4.32   HEMOGLOBIN g/dL 9.2* 11.1*   HEMATOCRIT % 27.3* 31.8*   PLATELETS 10*3/mm3 133* 152         Results from last 7 days   Lab Units 07/10/22  1209   TROPONIN T ng/mL <0.010             I have reviewed the patient's laboratory results.    Radiology results:    CT Head Without Contrast    Result Date: 7/10/2022  PROCEDURE: CT HEAD WO CONTRAST-  INDICATION:  Altered mental status, fever  TECHNIQUE: Multiple axial CT images were performed from the foramen magnum to the vertex without contrast.   Coronal reconstruction images were obtained from the axial data.  COMPARISON: None.  FINDINGS: There is no mass effect or midline shift.  The ventricles are symmetric in size and configuration.  There is no hydrocephalus.  There are no extraaxial fluid collections.  There is no intraventricular or intraparenchymal hemorrhage.  The posterior fossa has a normal CT appearance.  There is fluid in the right mastoid air cells. There is no air-fluid level in the paranasal sinuses. No acute osseous abnormalities are present.      Impression: No mass effect, midline shift, or intracranial hemorrhage..  Right mastoiditis.       705.80 mGy.cm    This study was performed with techniques to keep radiation doses as low as reasonably achievable (ALARA). Individualized dose reduction techniques using automated exposure control or adjustment of mA and/or kV according to the patient size were employed.  This report was signed and finalized on 7/10/2022 1:40 PM by Radha Chen MD.    XR Chest 1 View    Result Date: 7/10/2022  PROCEDURE: XR CHEST 1 VW-  INDICATION:  AMS  FINDINGS:  A portable view of the chest was obtained.  Comparison is made to a prior exam dated 07/25/2018.   The cardiac and mediastinal silhouettes are within normal limits.  The lungs are clear.  There is no pleural effusion or pneumothorax.  No acute osseous abnormality is identified.       Impression: No acute process on this portable exam.  This report was signed and finalized on 7/10/2022 12:21 PM by Radha Chen MD.    I have reviewed the patient's radiology reports.    Medication Review:     I have reviewed the patient's active and prn medications.     Problem List:      Metabolic encephalopathy    Fever      Assessment:    1. Sepsis of unknown origin, POA  2. Altered Mental Status, POA  3. Hyponatremia,  POA  4. Hypoglycemia  5. History of Severe Depression with Questionable Bipolar disorder  6. GERD  7. HTN  8. HLD        Plan:     Sepsis of unknown origin, POA/Hyponatremia/Hypoglycemia  - Negative procal/lactate/WBC- however hypotensive with fever- pancytopenia noted  - Blood cultures pending x 2 without growth  - Continue Vanc/Zosyn/Acyclovir- de-escalate as able  - Consult Anesthesiology for LP   -Cardiac Diet  - Monitor FSBS Q 6  - IV fluids discontinued  - If hypoglycemia persists will start D5W     Altered Mental Status, POA- Acute on Chronic  - Questionable misuse of benzodiazepines vs underlying undiagnosed Psych disorder  - Frequent hypoglycemia could also be contributing  - Hold all sedating medications at this time  - Will consider referral to Behavioral Health once infectious etiology ruled out     Continue home medications as appropriate. Would recommend discontinuing xanax.     DVT Prophylaxis: lovenox  Code Status: full  Diet: cardiac  Discharge Plan: home 1-2 days    Fransisca Marino, APRN  07/11/22  10:53 EDT    Dictated utilizing Dragon dictation.

## 2022-07-11 NOTE — PAYOR COMM NOTE
"TO:BC  FROM:DIANA PACE, RN PHONE [phone]  IN CLINICALS REF# [id number]    Lesly Matthews (51 y.o. Female)             Date of Birth   1970    Social Security Number       Address   [address] [facility] [address]    Home Phone   [phone]    MRN   [id number]       [place]   Other    Marital Status                               Admission Date   7/10/22    Admission Type   Emergency    Admitting Provider   Master Romo MD    Attending Provider   Master Romo MD    Department, Room/Bed   [facility] MED SURG  4, 427/1       Discharge Date       Discharge Disposition       Discharge Destination                               Attending Provider: Master Romo MD    Allergies: Seroquel [Quetiapine Fumarate], Trazodone And Nefazodone, Wasp Venom, Atorvastatin    Isolation: None   Infection: None   Code Status: CPR   Advance Care Planning Activity    Ht: 149.9 cm (59\")   Wt: 78 kg (171 lb 15.3 oz)    Admission Cmt: None   Principal Problem: Metabolic encephalopathy [G93.41]                 Active Insurance as of 7/10/2022     Primary Coverage     Payor Plan Insurance Group Employer/Plan Group    [employer]HEM BLUE CROSS ANTHEM BLUE CROSS BLUE SHIELD PPO [id number]     Payor Plan Address Payor Plan Phone Number Payor Plan Fax Number Effective Dates    [address] [phone]  1/1/2019 - None Entered    [place] [address]       Subscriber Name Subscriber Birth Date Member ID       EDGARDO MATTHEWS 9/23/1975 [id number]                 Emergency Contacts      (Rel.) Home Phone Work Phone Mobile Phone    Edgardo Matthews (Spouse) [phone] -- --    JORGERAKESH (Mother) [phone] -- --    Anthony Matthews -- -- [phone]               History & Physical      Ned, NADINE Malone at 07/10/22 1718     Attestation signed by Master Romo MD at 07/10/22 1906    I have reviewed this documentation and agree.                    [facility] " [facility]   HISTORY AND PHYSICAL      Name:  Lesly Nicholas   Age:  51 y.o.  Sex:  female  :  1970  MRN:  [id number]   Visit Number:  [id number]  Admission Date:  7/10/2022  Date Of Service:  07/10/22  Primary Care Physician:  Aimee Canela MD    Chief Complaint:     AMS and fever    History Of Presenting Illness:      51-year-old female presented to the emergency department with her mother due to worsening altered mental status as well as fever.  Pertinent past medical history includes depression and anxiety, suicide attempt, self-mutilation, recent seizure 2 months ago with fall for which patient hit her head CT negative at that time, GERD, hypertension, hyperlipidemia, and insomnia.  Patient history obtained per son and mother as well as patient.  History is very scattered.  Per mother patient has been having episodes where she appears altered for the past several months and even years.  Patient states that her  accuses her of misusing drugs.  She is currently prescribed Xanax for which she states she takes as prescribed.  When asked if patient had ever been diagnosed with bipolar disorder she states yes.  Denies any current thoughts of suicidal or homicidal ideations.  States that her history of self-mutilation occurred while she was possessed years ago.  Patient currently lives at home with her  and son.  Denies associated history of drug addiction.  Onset of fever a couple of days ago.  States that she was exposed to an ill child recently.  Denies associated cough, dysuria, rash, or shortness of air.  Mother also stated that patient's altered mental status began during menopause and resolved after an ablation.  Mother states that patient does have a history of seizure disorder in the past however had not had recent seizure until fall recently.  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 States also taking magnesium and Calcium supplements which seem to cause  worsening confusion.    Upon ED presentation patient noted to have temperature of 103 with other vital signs stable.  Labs mostly unremarkable with urinalysis, chest x-ray, respiratory panel, CBC, within normal limits.  Sodium noted to be 126 with mild elevation of creatinine at 1.1.  Acetaminophen and salicylate levels negative.  UDS positive for benzodiazepines and tricyclic antidepressants.  Ethanol level negative.  CT of head revealed right mastoiditis.  Blood cultures x2 pending.  Procalcitonin and lactate negative.  LP was attempted in the emergency department however was unsuccessful.  Patient was initiated on acyclovir, vancomycin, and Zosyn in the emergency department.  Fever improved with Tylenol and ibuprofen administration.  Prior to patient arriving to floor hypotension noted.  Per RN staff patient somnolent at times with nasal cannula applied.  Hospitalist contacted for further medical management.    Review Of Systems:    All systems were reviewed and negative except as mentioned in history of presenting illness, assessment and plan.    Past Medical History: Patient  has a past medical history of Anxiety, Arthritis, Depression, GERD (gastroesophageal reflux disease), Hyperlipemia, Hypertension, IBS (irritable bowel syndrome), Kidney disease, Kidney stone, Menometrorrhagia (2011), Osteoporosis, Reflux esophagitis, and Urinary tract infection.    Past Surgical History: Patient  has a past surgical history that includes d & c hysteroscopy endometrial ablation (12/29/2011); Gallbladder surgery; Central tooth extraction; Cholecystectomy; Tubal ligation; and Colonoscopy (N/A, 6/7/2021).    Social History: Patient  reports that she has quit smoking. She has never used smokeless tobacco. She reports that she does not drink alcohol and does not use drugs.    Family History: Patient's family history includes Coronary artery disease in her paternal uncle; Heart attack in her paternal uncle; Hypertension in her  father and mother; Kidney disease in her father; Osteoporosis in her paternal grandmother.    Allergies:      Seroquel [quetiapine fumarate], Trazodone and nefazodone, Wasp venom, and Atorvastatin    Home Medications:    Prior to Admission Medications     Prescriptions Last Dose Informant Patient Reported? Taking?    acyclovir (ZOVIRAX) 400 MG tablet   Yes No    acyclovir 400 mg tablet   TAKE ONE TABLET BY MOUTH TWICE A DAY    albuterol sulfate  (90 Base) MCG/ACT inhaler   No No    Inhale 2 puffs Every 6 (Six) Hours As Needed for Wheezing or Shortness of Air.    clonazePAM (KlonoPIN) 1 MG tablet   Yes No    2 (Two) Times a Day.    FLUoxetine (PROzac) 40 MG capsule   Yes No    Take 80 mg by mouth Daily.    lisinopril (PRINIVIL,ZESTRIL) 20 MG tablet   Yes No    Take 20 mg by mouth Daily.    metoprolol succinate XL (TOPROL-XL) 25 MG 24 hr tablet   No No    Take 1 tablet by mouth Every Night for 360 days.    Omega-3 Fatty Acids (fish oil) 1000 MG capsule capsule   Yes No    Take  by mouth Daily With Breakfast.    pantoprazole (PROTONIX) 40 MG EC tablet   Yes No    Take 40 mg by mouth Daily.    Potassium Gluconate (SM POTASSIUM PO)   Yes No    Take  by mouth.        ED Medications:    Medications   acyclovir (ZOVIRAX) 580 mg in sodium chloride 0.9 % 100 mL IVPB (has no administration in time range)   dextrose (D50W) (25 g/50 mL) IV injection 50 mL (50 mL Intravenous Given 7/10/22 1717)   acetaminophen (TYLENOL) tablet 1,000 mg (1,000 mg Oral Given 7/10/22 1215)   ibuprofen (ADVIL,MOTRIN) tablet 400 mg (400 mg Oral Given 7/10/22 1215)   sodium chloride 0.9 % bolus 1,000 mL (0 mL Intravenous Stopped 7/10/22 1458)   vancomycin 1500 mg/500 mL 0.9% NS IVPB ([facility]) (0 mg Intravenous Stopped 7/10/22 1708)   piperacillin-tazobactam (ZOSYN) 3.375 g in iso-osmotic dextrose 50 ml (premix) (0 g Intravenous Stopped 7/10/22 1503)   ondansetron (ZOFRAN) injection 4 mg (4 mg Intravenous Given 7/10/22 1458)     Vital Signs:  Temp:   "[98.3 °F (36.8 °C)-103.1 °F (39.5 °C)] 98.3 °F (36.8 °C)  Heart Rate:  [] 78  Resp:  [18] 18  BP: (109-146)/(60-92) 110/74        07/10/22  1150   Weight: 72.6 kg (160 lb)     Body mass index is 32.32 kg/m².    Physical Exam:     Most recent vital Signs: /74   Pulse 78   Temp 98.3 °F (36.8 °C)   Resp 18   Ht 149.9 cm (59\")   Wt 72.6 kg (160 lb)   SpO2 96%   BMI 32.32 kg/m²     Physical Exam  Vitals and nursing note reviewed.   Constitutional:       Comments: Drowsy.  Appears intoxicated.   HENT:      Head: Normocephalic.      Right Ear: External ear normal.      Left Ear: External ear normal.      Ears:      Comments: No pain with palpation to mastoid area.     Nose: Nose normal.      Mouth/Throat:      Mouth: Mucous membranes are moist.   Eyes:      Pupils: Pupils are equal, round, and reactive to light.   Cardiovascular:      Rate and Rhythm: Normal rate and regular rhythm.      Pulses: Normal pulses.      Heart sounds: Normal heart sounds.   Pulmonary:      Effort: Pulmonary effort is normal.      Breath sounds: Normal breath sounds.   Abdominal:      General: Bowel sounds are normal.      Palpations: Abdomen is soft.   Musculoskeletal:         General: Normal range of motion.      Cervical back: Normal range of motion and neck supple. No rigidity or tenderness.      Right lower leg: No edema.      Left lower leg: No edema.   Skin:     General: Skin is warm.      Capillary Refill: Capillary refill takes less than 2 seconds.      Comments: Scarring noted bilateral forearms.   Neurological:      General: No focal deficit present.      Mental Status: She is alert. She is disoriented.   Psychiatric:      Comments: Euphoric mood noted.  Abnormal judgment and thought content.         Laboratory data:    I have reviewed the labs done in the emergency room.    Results from last 7 days   Lab Units 07/10/22  1209   SODIUM mmol/L 126*   POTASSIUM mmol/L 4.5   CHLORIDE mmol/L 93*   CO2 mmol/L 22.0   BUN " mg/dL 15   CREATININE mg/dL 1.17*   CALCIUM mg/dL 8.8   BILIRUBIN mg/dL 0.2   ALK PHOS U/L 84   ALT (SGPT) U/L 15   AST (SGOT) U/L 22   GLUCOSE mg/dL 70     Results from last 7 days   Lab Units 07/10/22  1209   WBC 10*3/mm3 4.32   HEMOGLOBIN g/dL 11.1*   HEMATOCRIT % 31.8*   PLATELETS 10*3/mm3 152         Results from last 7 days   Lab Units 07/10/22  1209   TROPONIN T ng/mL <0.010             Results from last 7 days   Lab Units 07/10/22  1209   LIPASE U/L 45         Results from last 7 days   Lab Units 07/10/22  1500   COLOR UA  Yellow   GLUCOSE UA  Negative   KETONES UA  Negative   LEUKOCYTES UA  Negative   PH, URINE  5.5   BILIRUBIN UA  Negative   UROBILINOGEN UA  0.2 E.U./dL       Pain Management Panel     Pain Management Panel Latest Ref Rng & Units 7/10/2022 7/25/2018    AMPHETAMINES SCREEN, URINE Negative Negative Negative    BARBITURATES SCREEN Negative Negative Negative    BENZODIAZEPINE SCREEN, URINE Negative Positive(A) Negative    BUPRENORPHINEUR Negative Negative Negative    COCAINE SCREEN, URINE Negative Negative Negative    METHADONE SCREEN, URINE Negative Negative Negative    METHAMPHETAMINEUR Negative Negative Negative          EKG:      Sinus tach 107 BPM    Radiology:    CT Head Without Contrast    Result Date: 7/10/2022  PROCEDURE: CT HEAD WO CONTRAST-  INDICATION: Altered mental status, fever  TECHNIQUE: Multiple axial CT images were performed from the foramen magnum to the vertex without contrast.   Coronal reconstruction images were obtained from the axial data.  COMPARISON: None.  FINDINGS: There is no mass effect or midline shift.  The ventricles are symmetric in size and configuration.  There is no hydrocephalus.  There are no extraaxial fluid collections.  There is no intraventricular or intraparenchymal hemorrhage.  The posterior fossa has a normal CT appearance.  There is fluid in the right mastoid air cells. There is no air-fluid level in the paranasal sinuses. No acute osseous  abnormalities are present.      No mass effect, midline shift, or intracranial hemorrhage..  Right mastoiditis.       705.80 mGy.cm    This study was performed with techniques to keep radiation doses as low as reasonably achievable (ALARA). Individualized dose reduction techniques using automated exposure control or adjustment of mA and/or kV according to the patient size were employed.  This report was signed and finalized on 7/10/2022 1:40 PM by Radha Chen MD.    XR Chest 1 View    Result Date: 7/10/2022  PROCEDURE: XR CHEST 1 VW-  INDICATION:  AMS  FINDINGS:  A portable view of the chest was obtained.  Comparison is made to a prior exam dated 07/25/2018.   The cardiac and mediastinal silhouettes are within normal limits.  The lungs are clear.  There is no pleural effusion or pneumothorax.  No acute osseous abnormality is identified.       No acute process on this portable exam.  This report was signed and finalized on 7/10/2022 12:21 PM by Radha Chen MD.      Assessment:    1. Sepsis of unknown origin, POA  2. Altered Mental Status, POA  3. Hyponatremia, POA  4. Hypoglycemia  5. History of Severe Depression with Questionable Bipolar disorder  6. GERD  7. HTN  8. HLD       Plan:    Sepsis of unknown origin, POA/Hyponatremia/Hypoglycemia  - Negative procal/lactate/WBC- however hypotensive with fever   - Blood cultures pending x 2  - Continue Vanc/Zosyn/Acyclovir- de-escalate as able  - Consult Anesthesiology for LP   -Cardiac Diet  - Monitor FSBS Q 6  - IV fluids    Altered Mental Status, POA- Acute on Chronic  - Questionable misuse of benzodiazepines vs underlying undiagnosed Psych disorder  - Hold all sedating medications at this time  - Will consider referral to Behavioral Health once infectious etiology ruled out    Continue home medications as appropriate.    Risk Assessment: high  DVT Prophylaxis: lovenox  Code Status: full  Diet: cardiac    Advance Care Planning   ACP discussion was declined by the  "patient. Patient does not have an advance directive, declines further assistance.       Fransisca Marino, APRN  07/10/22  17:18 EDT    Dictated utilizing Dragon dictation.    Electronically signed by Master Romo MD at 07/10/22 1906          Emergency Department Notes      Sirera Mensah PA-C at 07/10/22 1206     Attestation signed by Leo Escobedo DO at 07/10/22 1641        NON FACE TO FACE: This visit was performed by BOTH a physician and an APC. I performed all aspects of the MDM as documented.  Leo Escobedo DO 7/10/2022 16:41 EDT                         Subjective   History of Present Illness   Patient is a 51-year-old female with history of anxiety, arthritis, depression, GERD, hyperlipidemia, hypertension, irritable bowel syndrome, kidney stones, among other comorbidities presenting to the ER with complaints of fever and altered mental status.  Patient's son is at bedside who states that the patient has been loopy and speaking out of her head.  He states that she has been like this before during menopause and patient states that she \"had it all removed\" to help with this.  Patient's son states that she is acting abnormal.  He states that she has had symptoms for the last few days but states that she was more loopy today.  Patient states that she does take benzos, Klonopin is listed on her medication list.  Patient son states that she does take a handful of medications which could be contributing to symptoms.  Patient states that she does have a recent sick contact, states that she has been around a little kid who has been sick.  She does appear to be altered, originally thought that Obama was president and it was October.  She is oriented to person, place, and year and was eventually able to tell me that it is July and that Nasima was president.    Patient's mother arrived and provided more history. Reports patient has had episodes of being altered during menopause that resolved after an ablation. " Reports she was fine after that until a couple months ago when she had a seizure and fell and hit her head while she was in [place]. She states she had h/o seizures but prior to that one, had not had one in 10 years. She states the patient has had episodes of being altered since the fall. She states at the time of the fall, she had a head CT that did not show any brain injury. Denies anticoagulant use. She states the patient's  has accused her of being on drugs because of the behavior. She states patient has basically been acting like she is drunk at times but has not used alcohol. She states that she has been concerned about auto-brewery syndrome.    Review of Systems   Constitutional: Positive for fever.   Gastrointestinal: Positive for nausea and vomiting.   Psychiatric/Behavioral: Positive for confusion.   All other systems reviewed and are negative.      Past Medical History:   Diagnosis Date   • Anxiety    • Arthritis    • Depression    • GERD (gastroesophageal reflux disease)    • Hyperlipemia    • Hypertension    • IBS (irritable bowel syndrome)    • Kidney disease    • Kidney stone    • Menometrorrhagia 2011   • Osteoporosis    • Reflux esophagitis    • Urinary tract infection        Allergies   Allergen Reactions   • Seroquel [Quetiapine Fumarate] Anaphylaxis   • Trazodone And Nefazodone Anaphylaxis   • Wasp Venom Anaphylaxis   • Atorvastatin Myalgia       Past Surgical History:   Procedure Laterality Date   • CHOLECYSTECTOMY     • COLONOSCOPY N/A 6/7/2021    Procedure: COLONOSCOPY;  Surgeon: Anna Vann MD;  Location: [facility] ENDOSCOPY;  Service: Gastroenterology;  Laterality: N/A;   • D & C HYSTEROSCOPY ENDOMETRIAL ABLATION  12/29/2011    NOVASURE ABLATION (DR MILLER)   • GALLBLADDER SURGERY     • TUBAL ABDOMINAL LIGATION     • WISDOM TOOTH EXTRACTION         Family History   Problem Relation Age of Onset   • Hypertension Father    • Kidney disease Father    • Hypertension Mother    •  Osteoporosis Paternal Grandmother    • Coronary artery disease Paternal Uncle    • Heart attack Paternal Uncle    • Breast cancer Neg Hx        Social History     Socioeconomic History   • Marital status:    Tobacco Use   • Smoking status: Former Smoker   • Smokeless tobacco: Never Used   Vaping Use   • Vaping Use: Never used   Substance and Sexual Activity   • Alcohol use: No   • Drug use: No   • Sexual activity: Yes     Partners: Male           Objective   Physical Exam  Vitals and nursing note reviewed.   Constitutional:       General: She is not in acute distress.     Appearance: She is not toxic-appearing.   HENT:      Head: Normocephalic and atraumatic.      Right Ear: Hearing, tympanic membrane, ear canal and external ear normal.      Left Ear: Hearing, tympanic membrane, ear canal and external ear normal.      Nose: Nose normal.   Eyes:      Extraocular Movements: Extraocular movements intact.      Left eye: Normal extraocular motion.   Cardiovascular:      Rate and Rhythm: Tachycardia present.      Heart sounds: Normal heart sounds.   Pulmonary:      Effort: Pulmonary effort is normal.      Breath sounds: Normal breath sounds.   Abdominal:      General: There is no distension.      Palpations: Abdomen is soft.      Tenderness: There is no abdominal tenderness.   Musculoskeletal:         General: Normal range of motion.      Cervical back: Normal range of motion and neck supple.   Skin:     General: Skin is warm and dry.   Neurological:      General: No focal deficit present.      Mental Status: She is alert. She is confused.      Cranial Nerves: No cranial nerve deficit.      Motor: No weakness.      Comments: Oriented to person, place, year. Originally thought it was October and Derrell is president but did eventually correct herself   Psychiatric:      Comments: Patient currently is acting somewhat like she is innebriated; cooperative, following commands but not answering questions appropriately at  times and appears dazed         Procedures          ED Course  ED Course as of 07/10/22 1641   Sun Jul 10, 2022   1252 Lactate: 1.0 [AP]   1252 Salicylate: 0.8 [AP]   1252 Acetaminophen: <5.0 [AP]   1252 Ethanol: <10 [AP]   1252 Ethanol %: <0.010 [AP]   1252 Magnesium: 1.6 [AP]   1252 Troponin T: <0.010 [AP]   1252 Lipase: 45 [AP]   1252 WBC: 4.32 [AP]   1252 RBC(!): 3.71 [AP]   1252 Hemoglobin(!): 11.1 [AP]   1252 Hematocrit(!): 31.8 [AP]   1252 Platelets: 152 [AP]   1252 Glucose: 70 [AP]   1252 BUN: 15 [AP]   1252 Creatinine(!): 1.17 [AP]   1253 Sodium(!): 126 [AP]   1253 Potassium: 4.5 [AP]   1253 Chloride(!): 93 [AP]   1253 CO2: 22.0 [AP]   1253 Calcium: 8.8 [AP]   1253 Total Protein: 7.0 [AP]   1253 Albumin: 4.00 [AP]   1253 ALT (SGPT): 15 [AP]   1253 AST (SGOT): 22 [AP]   1253 Alkaline Phosphatase: 84 [AP]   1253 Total Bilirubin: 0.2 [AP]   1253 Globulin: 3.0 [AP]   1253 A/G Ratio: 1.3 [AP]   1253 BUN/Creatinine Ratio: 12.8 [AP]   1253 Anion Gap: 11.0 [AP]   1253 eGFR(!): 56.3 [AP]   1253 Narrative & Impression  PROCEDURE: XR CHEST 1 VW-     INDICATION:  AMS     FINDINGS:  A portable view of the chest was obtained.  Comparison is  made to a prior exam dated 07/25/2018.   The cardiac and mediastinal  silhouettes are within normal limits.  The lungs are clear.  There is no  pleural effusion or pneumothorax.  No acute osseous abnormality is  identified.         IMPRESSION:  No acute process on this portable exam.     This report was signed and finalized on 7/10/2022 12:21 PM by Radha Chen MD.          Specimen Collected: 07/10/22 12:20 Last Resulted: 07/10/22 12:21           [AP]   1322 RVP negative. [AP]   1341 EKG interpreted by me.  Sinus Garcia Am.  Tachycardic.  Rate of 107.  No obvious ST or T wave changes.  Abnormal EKG. [CG]   1354 Narrative & Impression  PROCEDURE: CT HEAD WO CONTRAST-     INDICATION: Altered mental status, fever     TECHNIQUE: Multiple axial CT images were performed from the  foramen  magnum to the vertex without contrast.   Coronal reconstruction images  were obtained from the axial data.     COMPARISON: None.     FINDINGS: There is no mass effect or midline shift.  The ventricles are  symmetric in size and configuration.  There is no hydrocephalus.  There  are no extraaxial fluid collections.  There is no intraventricular or  intraparenchymal hemorrhage.  The posterior fossa has a normal CT  appearance.  There is fluid in the right mastoid air cells. There is no  air-fluid level in the paranasal sinuses. No acute osseous abnormalities  are present.     IMPRESSION:  No mass effect, midline shift, or intracranial hemorrhage..     Right mastoiditis.                    705.80 mGy.cm           This study was performed with techniques to keep radiation doses as low  as reasonably achievable (ALARA). Individualized dose reduction  techniques using automated exposure control or adjustment of mA and/or  kV according to the patient size were employed.      This report was signed and finalized on 7/10/2022 1:40 PM by Radha Chen MD.          Specimen Collected: 07/10/22 13:38         [AP]   1408 Procalcitonin: 0.21 [AP]   1449 Nursing staff reported patient still unable to give urine sample, requesting cath. They plan to perform in and out catheterization on the patient. [AP]   1457 Discussed CT results with patient. Ask her if she has been having pain behind right ear. She states she has had pain there for years. [AP]   1510 Color, UA: Yellow [AP]   1510 Appearance, UA: Clear [AP]   1510 pH, UA: 5.5 [AP]   1510 Specific Gravity, UA: <=1.005 [AP]   1510 Glucose: Negative [AP]   1511 Ketones, UA: Negative [AP]   1511 Bilirubin, UA: Negative [AP]   1511 Blood, UA: Negative [AP]   1511 Protein, UA: Negative [AP]   1511 Nitrite, UA: Negative [AP]   1511 Leukocytes, UA: Negative [AP]   1511 Urobilinogen, UA: 0.2 E.U./dL [AP]   1532 Benzodiazepine Screen, Urine(!): Positive [AP]   1533 Tricyclic  Antidepressants Screen(!): Positive [AP]   1538 Spoke with Dr. Romo who states LP is warranted given fever, AMS, mastoiditis on CT, hyponatremia. Recommends calling anesthetist as they are sometimes on call if ER physician is unable to perform. [AP]      ED Course User Index  [AP] Sierra Mensah, ANAMIKA  [CG] Leo Escobedo B, DO                                           [facility]   Patient was evaluated in the ER for fever and altered mental status.  Patient is tachycardic with fever of 103 upon arrival.  Patient was otherwise hemodynamically stable.  She did appear to be acutely altered, not answering questions appropriately and appeared dazed.  Was not oriented to month or who the president is originally.  She is following commands and has no focal neurologic deficits on exam.  No ataxia.  Labs were unremarkable with normal lactate, procalcitonin, troponin, liver function, and white blood cell count.  Creatinine very mildly bumped at 1.17.  Labs are notable for hyponatremia with sodium of 126.  Respiratory panel is negative.  Urinalysis is not indicative of an infectious process.  Chest x-ray is unremarkable.  CT of head revealed no mass-effect or large cortical infarct but did reveal right mastoiditis.  Patient's vitals have improved after medications in the ER.  Hospitalist, Dr. Romo, was consulted and requested that an LP be performed.  Dr. Escobedo, ED attending physician, attempted to perform LP but it was unsuccessful.  Dr. Romo was contacted again and recommended starting acyclovir.  He graciously accepted the patient for admission for further evaluation and management.    Final diagnoses:   Fever, unspecified fever cause   Altered mental status, unspecified altered mental status type   Mastoiditis, right   Hyponatremia   Encephalitis       ED Disposition  ED Disposition     ED Disposition   Decision to Admit    Condition   --    Comment   Level of Care: Med/Surg [1]   Diagnosis: Fever, unspecified fever cause  [[id number]]   Admitting Physician: JACINDA MCKEON [[id number]]   Attending Physician: LLOYD CHANDLER [[id number]]   Isolate for COVID?: No [0]   Certification: I Certify That Inpatient Hospital Services Are Medically Necessary For Greater Than 2 Midnights               No follow-up provider specified.       Medication List      No changes were made to your prescriptions during this visit.          Sierra Mensah PA-C  07/10/22 1641      Electronically signed by Lloyd Chandler, DO at 07/10/22 1641     Rj Hinds PCT at 07/10/22 1703        Glucose 67    Electronically signed by Rj Hinds PCT at 07/10/22 1703     Ernst Dejesus, RN at 07/10/22 1759        Pt was given 2 liters of fluids bolused 1 per ER Provider and 1 Per Fransisca Hospitalist provider     Electronically signed by Ernst Dejesus, RN at 07/10/22 1800     Ernst Dejesus, RN at 07/10/22 1815        Report to the floor MD Gongora asked to place pt on Tele because of BP being low and informed     Electronically signed by Ernst Dejesus, RN at 07/10/22 1816       Vital Signs (last day)     Date/Time Temp Temp src Pulse Resp BP Patient Position SpO2    07/11/22 0800 97.6 (36.4) Oral 77 18 99/52 Sitting 100    07/11/22 0443 97.3 (36.3) Axillary 77 18 112/70 Sitting 98    07/10/22 2330 97.3 (36.3) Oral 75 18 96/60 Lying 97    07/10/22 2145 -- -- -- -- 104/68 -- --    07/10/22 1957 97.2 (36.2) Oral 68 18 93/54 Lying 100    07/10/22 1858 -- -- 70 -- 89/47 Lying 100    07/10/22 1827 97.7 (36.5) Oral 64 17 78/52 Lying 100    07/10/22 1802 98 (36.7) -- -- -- -- -- --    07/10/22 1730 -- -- 71 -- 89/57 -- 100    07/10/22 1719 -- -- 73 -- 90/57 -- 99    07/10/22 1515 -- -- -- -- 96/54 -- 96    07/10/22 1503 -- -- -- -- 156/43 -- 95    07/10/22 1459 -- -- 78 -- 91/50 -- 96    07/10/22 1457 98.3 (36.8) -- -- -- -- -- --    07/10/22 1345 -- -- 96 -- 110/74 -- 93    07/10/22 1315 -- -- 102 -- 109/60 -- 98    07/10/22 1245 -- -- -- -- 128/92 -- 90    07/10/22 1230  -- -- -- -- 130/84 -- 97    07/10/22 1213 -- -- -- -- 146/86 -- --    07/10/22 1150 103.1 (39.5) Oral 112 18 124/90 Sitting 99            Current Facility-Administered Medications   Medication Dose Route Frequency Provider Last Rate Last Admin   • acetaminophen (TYLENOL) tablet 650 mg  650 mg Oral Q4H PRN Fransisca Marino APRN       • acyclovir (ZOVIRAX) 580 mg in sodium chloride 0.9 % 100 mL IVPB  10 mg/kg (Adjusted) Intravenous Q8H NedFransisca shah APRN   580 mg at 07/11/22 0938   • dextrose (D50W) (25 g/50 mL) IV injection 50 mL  50 mL Intravenous Q1H PRN Leo Escobedo DO   50 mL at 07/10/22 1717   • lidocaine (XYLOCAINE) 1 % injection  - ADS Override Pull            • ondansetron (ZOFRAN) tablet 4 mg  4 mg Oral Q6H PRN Fransisca Marino APRN        Or   • ondansetron (ZOFRAN) injection 4 mg  4 mg Intravenous Q6H PRN Fransisca Marino APRN   4 mg at 07/10/22 2303   • Pharmacy to dose vancomycin   Does not apply Continuous PRN Meliton Valera RPH       • Pharmacy to Dose Zosyn   Does not apply Continuous PRN Meliton Valera RPH       • piperacillin-tazobactam (ZOSYN) 3.375 g in iso-osmotic dextrose 50 ml (premix)  3.375 g Intravenous Q8H Fransisca Marino APRN   3.375 g at 07/11/22 0452   • sodium chloride 0.9 % flush 10 mL  10 mL Intravenous Q12H Fransisca Marino APRN   10 mL at 07/10/22 2047   • sodium chloride 0.9 % flush 10 mL  10 mL Intravenous PRN Fransisca Marino APRN       • sodium chloride 0.9 % infusion  125 mL/hr Intravenous Continuous Master Romo  mL/hr at 07/10/22 2047 125 mL/hr at 07/10/22 2047   • vancomycin 1 g/250 mL 0.9% NS (vial-mate)  1,000 mg Intravenous Q12H Meliton Valera RPH           Lab Results (last 24 hours)     Procedure Component Value Units Date/Time    Vancomycin, Random [[id number]]  (Normal) Collected: 07/11/22 0545    Specimen: Blood Updated: 07/11/22 0804     Vancomycin Random 8.00 mcg/mL     Narrative:      Therapeutic Ranges for Vancomycin    Vancomycin Random    5.0-40.0 mcg/mL  Vancomycin Trough   5.0-20.0 mcg/mL  Vancomycin Peak     20.0-40.0 mcg/mL    POC Glucose Once [[id number]]  (Normal) Collected: 07/11/22 0642    Specimen: Blood Updated: 07/11/22 0645     Glucose 82 mg/dL      Comment: Serial Number: [id number]:  [id number]       Basic Metabolic Panel [[id number]]  (Abnormal) Collected: 07/11/22 0545    Specimen: Blood Updated: 07/11/22 0642     Glucose 45 mg/dL      BUN 10 mg/dL      Creatinine 0.77 mg/dL      Sodium 140 mmol/L      Potassium 3.9 mmol/L      Chloride 109 mmol/L      CO2 19.3 mmol/L      Calcium 8.0 mg/dL      BUN/Creatinine Ratio 13.0     Anion Gap 11.7 mmol/L      eGFR 92.9 mL/min/1.73      Comment: National Kidney Foundation and American Society of Nephrology (ASN) Task Force recommended calculation based on the Chronic Kidney Disease Epidemiology Collaboration (CKD-EPI) equation refit without adjustment for race.       Narrative:      GFR Normal >60  Chronic Kidney Disease <60  Kidney Failure <15      POC Glucose Once [[id number]]  (Abnormal) Collected: 07/11/22 0604    Specimen: Blood Updated: 07/11/22 0614     Glucose 42 mg/dL      Comment: Serial Number: [id number]:  [id number]       CBC Auto Differential [[id number]]  (Abnormal) Collected: 07/11/22 0545    Specimen: Blood Updated: 07/11/22 0613     WBC 3.04 10*3/mm3      RBC 3.05 10*6/mm3      Hemoglobin 9.2 g/dL      Hematocrit 27.3 %      MCV 89.5 fL      MCH 30.2 pg      MCHC 33.7 g/dL      RDW 12.9 %      RDW-SD 42.0 fl      MPV 9.3 fL      Platelets 133 10*3/mm3      Neutrophil % 62.5 %      Lymphocyte % 22.7 %      Monocyte % 12.2 %      Eosinophil % 2.0 %      Basophil % 0.3 %      Immature Grans % 0.3 %      Neutrophils, Absolute 1.90 10*3/mm3      Lymphocytes, Absolute 0.69 10*3/mm3      Monocytes, Absolute 0.37 10*3/mm3      Eosinophils, Absolute 0.06 10*3/mm3      Basophils, Absolute 0.01 10*3/mm3      Immature Grans, Absolute 0.01 10*3/mm3      nRBC 0.0 /100 WBC     POC  Glucose Once [[id number]]  (Normal) Collected: 07/10/22 1838    Specimen: Blood Updated: 07/10/22 1842     Glucose 83 mg/dL      Comment: Serial Number: [id number]:  [id number]       Urine Drug Screen - Urine, Clean Catch [[id number]]  (Abnormal) Collected: 07/10/22 1500    Specimen: Urine, Clean Catch Updated: 07/10/22 1532     THC, Screen, Urine Negative     Phencyclidine (PCP), Urine Negative     Cocaine Screen, Urine Negative     Methamphetamine, Ur Negative     Opiate Screen Negative     Amphetamine Screen, Urine Negative     Benzodiazepine Screen, Urine Positive     Tricyclic Antidepressants Screen Positive     Methadone Screen, Urine Negative     Barbiturates Screen, Urine Negative     Oxycodone Screen, Urine Negative     Propoxyphene Screen Negative     Buprenorphine, Screen, Urine Negative    Narrative:      Limitations of this procedure include the possibility of false positives due to interfering substances in the urine sample. Clinical data should be correlated with any questionable result. Positive results should be considered Presumptive Positive until results are confirmed with another methodology such as HPLC or GCMS.    Urinalysis With Microscopic If Indicated (No Culture) - Urine, Clean Catch [[id number]]  (Normal) Collected: 07/10/22 1500    Specimen: Urine, Clean Catch Updated: 07/10/22 1510     Color, UA Yellow     Appearance, UA Clear     pH, UA 5.5     Specific Gravity, UA <=1.005     Glucose, UA Negative     Ketones, UA Negative     Bilirubin, UA Negative     Blood, UA Negative     Protein, UA Negative     Leuk Esterase, UA Negative     Nitrite, UA Negative     Urobilinogen, UA 0.2 E.U./dL    Narrative:      Urine microscopic not indicated.    Blood Culture - Blood, Hand, Left [[id number]] Collected: 07/10/22 1432    Specimen: Blood from Hand, Left Updated: 07/10/22 1447    Blood Culture - Blood, Arm, Right [[id number]] Collected: 07/10/22 1435    Specimen: Blood from Arm, Right Updated:  "07/10/22 1447    Procalcitonin [[id number]]  (Normal) Collected: 07/10/22 1209    Specimen: Blood Updated: 07/10/22 1402     Procalcitonin 0.21 ng/mL     Narrative:      As a Marker for Sepsis (Non-Neonates):    1. <0.5 ng/mL represents a low risk of severe sepsis and/or septic shock.  2. >2 ng/mL represents a high risk of severe sepsis and/or septic shock.    As a Marker for Lower Respiratory Tract Infections that require antibiotic therapy:    PCT on Admission    Antibiotic Therapy       6-12 Hrs later    >0.5                Strongly Recommended  >0.25 - <0.5        Recommended   0.1 - 0.25          Discouraged              Remeasure/reassess PCT  <0.1                Strongly Discouraged     Remeasure/reassess PCT    As 28 day mortality risk marker: \"Change in Procalcitonin Result\" (>80% or <=80%) if Day 0 (or Day 1) and Day 4 values are available. Refer to http://www.[email or web address]-pct-calculator.com    Change in PCT <=80%  A decrease of PCT levels below or equal to 80% defines a positive change in PCT test result representing a higher risk for 28-day all-cause mortality of patients diagnosed with severe sepsis for septic shock.    Change in PCT >80%  A decrease of PCT levels of more than 80% defines a negative change in PCT result representing a lower risk for 28-day all-cause mortality of patients diagnosed with severe sepsis or septic shock.       Respiratory Panel PCR w/COVID-19(SARS-CoV-2) ELISHA/DEJA/MELINDA/PAD/COR/MAD/STEPHY In-House, NP Swab in [facility], 3-4 HR TAT - Swab, Nasopharynx [[id number]]  (Normal) Collected: 07/10/22 1209    Specimen: Swab from Nasopharynx Updated: 07/10/22 1310     ADENOVIRUS, PCR Not Detected     Coronavirus 229E Not Detected     Coronavirus HKU1 Not Detected     Coronavirus NL63 Not Detected     Coronavirus OC43 Not Detected     COVID19 Not Detected     Human Metapneumovirus Not Detected     Human Rhinovirus/Enterovirus Not Detected     Influenza A PCR Not Detected     Influenza B PCR Not " Detected     Parainfluenza Virus 1 Not Detected     Parainfluenza Virus 2 Not Detected     Parainfluenza Virus 3 Not Detected     Parainfluenza Virus 4 Not Detected     RSV, PCR Not Detected     Bordetella pertussis pcr Not Detected     Bordetella parapertussis PCR Not Detected     Chlamydophila pneumoniae PCR Not Detected     Mycoplasma pneumo by PCR Not Detected    Narrative:      In the setting of a positive respiratory panel with a viral infection PLUS a negative procalcitonin without other underlying concern for bacterial infection, consider observing off antibiotics or discontinuation of antibiotics and continue supportive care. If the respiratory panel is positive for atypical bacterial infection (Bordetella pertussis, Chlamydophila pneumoniae, or Mycoplasma pneumoniae), consider antibiotic de-escalation to target atypical bacterial infection.    Troponin [[id number]]  (Normal) Collected: 07/10/22 1209    Specimen: Blood Updated: 07/10/22 1245     Troponin T <0.010 ng/mL     Narrative:      Troponin T Reference Range:  <= 0.03 ng/mL-   Negative for AMI  >0.03 ng/mL-     Abnormal for myocardial necrosis.  Clinicians would have to utilize clinical acumen, EKG, Troponin and serial changes to determine if it is an Acute Myocardial Infarction or myocardial injury due to an underlying chronic condition.       Results may be falsely decreased if patient taking Biotin.      Lipase [[id number]]  (Normal) Collected: 07/10/22 1209    Specimen: Blood Updated: 07/10/22 1242     Lipase 45 U/L     Ethanol [[id number]] Collected: 07/10/22 1209    Specimen: Blood Updated: 07/10/22 1242     Ethanol <10 mg/dL      Ethanol % <0.010 %     Narrative:      This result is for medical use only and should not be used for forensic purposes.    Acetaminophen Level [[id number]]  (Normal) Collected: 07/10/22 1209    Specimen: Blood Updated: 07/10/22 1242     Acetaminophen <5.0 mcg/mL     Narrative:      Toxic = Greater than 150 mcg/mL     Comprehensive Metabolic Panel [[id number]]  (Abnormal) Collected: 07/10/22 1209    Specimen: Blood Updated: 07/10/22 1242     Glucose 70 mg/dL      BUN 15 mg/dL      Creatinine 1.17 mg/dL      Sodium 126 mmol/L      Potassium 4.5 mmol/L      Chloride 93 mmol/L      CO2 22.0 mmol/L      Calcium 8.8 mg/dL      Total Protein 7.0 g/dL      Albumin 4.00 g/dL      ALT (SGPT) 15 U/L      AST (SGOT) 22 U/L      Alkaline Phosphatase 84 U/L      Total Bilirubin 0.2 mg/dL      Globulin 3.0 gm/dL      A/G Ratio 1.3 g/dL      BUN/Creatinine Ratio 12.8     Anion Gap 11.0 mmol/L      eGFR 56.3 mL/min/1.73      Comment: National Kidney Foundation and American Society of Nephrology (ASN) Task Force recommended calculation based on the Chronic Kidney Disease Epidemiology Collaboration (CKD-EPI) equation refit without adjustment for race.       Narrative:      GFR Normal >60  Chronic Kidney Disease <60  Kidney Failure <15      Magnesium [[id number]]  (Normal) Collected: 07/10/22 1209    Specimen: Blood Updated: 07/10/22 1242     Magnesium 1.6 mg/dL     Salicylate Level [[id number]]  (Normal) Collected: 07/10/22 1209    Specimen: Blood Updated: 07/10/22 1242     Salicylate 0.8 mg/dL     Lactic Acid, Plasma [[id number]]  (Normal) Collected: 07/10/22 1209    Specimen: Blood Updated: 07/10/22 1240     Lactate 1.0 mmol/L     CBC & Differential [[id number]]  (Abnormal) Collected: 07/10/22 1209    Specimen: Blood Updated: 07/10/22 1220    Narrative:      The following orders were created for panel order CBC & Differential.  Procedure                               Abnormality         Status                     ---------                               -----------         ------                     CBC Auto Differential[[id number]]        Abnormal            Final result                 Please view results for these tests on the individual orders.    CBC Auto Differential [[id number]]  (Abnormal) Collected: 07/10/22 1209    Specimen: Blood Updated:  07/10/22 1220     WBC 4.32 10*3/mm3      RBC 3.71 10*6/mm3      Hemoglobin 11.1 g/dL      Hematocrit 31.8 %      MCV 85.7 fL      MCH 29.9 pg      MCHC 34.9 g/dL      RDW 12.3 %      RDW-SD 38.7 fl      MPV 9.0 fL      Platelets 152 10*3/mm3      Neutrophil % 78.9 %      Lymphocyte % 11.6 %      Monocyte % 7.9 %      Eosinophil % 0.9 %      Basophil % 0.5 %      Immature Grans % 0.2 %      Neutrophils, Absolute 3.41 10*3/mm3      Lymphocytes, Absolute 0.50 10*3/mm3      Monocytes, Absolute 0.34 10*3/mm3      Eosinophils, Absolute 0.04 10*3/mm3      Basophils, Absolute 0.02 10*3/mm3      Immature Grans, Absolute 0.01 10*3/mm3      nRBC 0.0 /100 WBC         Imaging Results (Last 24 Hours)     Procedure Component Value Units Date/Time    CT Head Without Contrast [[id number]] Collected: 07/10/22 1338     Updated: 07/10/22 1342    Narrative:      PROCEDURE: CT HEAD WO CONTRAST-     INDICATION: Altered mental status, fever     TECHNIQUE: Multiple axial CT images were performed from the foramen  magnum to the vertex without contrast.   Coronal reconstruction images  were obtained from the axial data.     COMPARISON: None.     FINDINGS: There is no mass effect or midline shift.  The ventricles are  symmetric in size and configuration.  There is no hydrocephalus.  There  are no extraaxial fluid collections.  There is no intraventricular or  intraparenchymal hemorrhage.  The posterior fossa has a normal CT  appearance.  There is fluid in the right mastoid air cells. There is no  air-fluid level in the paranasal sinuses. No acute osseous abnormalities  are present.       Impression:      No mass effect, midline shift, or intracranial hemorrhage..     Right mastoiditis.                    705.80 mGy.cm           This study was performed with techniques to keep radiation doses as low  as reasonably achievable (ALARA). Individualized dose reduction  techniques using automated exposure control or adjustment of mA and/or  kV  according to the patient size were employed.      This report was signed and finalized on 7/10/2022 1:40 PM by Radha Chen MD.    XR Chest 1 View [[id number]] Collected: 07/10/22 1220     Updated: 07/10/22 1223    Narrative:      PROCEDURE: XR CHEST 1 VW-     INDICATION:  AMS     FINDINGS:  A portable view of the chest was obtained.  Comparison is  made to a prior exam dated 2018.   The cardiac and mediastinal  silhouettes are within normal limits.  The lungs are clear.  There is no  pleural effusion or pneumothorax.  No acute osseous abnormality is  identified.           Impression:      No acute process on this portable exam.     This report was signed and finalized on 7/10/2022 12:21 PM by Radha Chen MD.           Physician Progress Notes (last 24 hours)      Master Romo MD at 07/10/22 2036              [facility]   FOLLOW UP NOTE    Name:  Lesly Nicholas   Age:  51 y.o.  Sex:  female  :  1970  MRN:  [id number]   Visit Number:  [id number]  Admission Date:  7/10/2022  Date Of Service:  07/10/22  Primary Care Physician:  Aimee Canela MD    Patient was seen and examined. Pertinent laboratory and radiology data were reviewed.  Called by the nurse that the patient's blood pressure is persistently low.  Patient is very drowsy and snoring when I saw her but was easily arousable.  She was able to answer all the questions appropriately.  She does not seem to be significantly symptomatic with the low blood pressures.  She also has relative bradycardia.  She does not have neck stiffness but does complain of pain in the back of the neck.    Vital signs:    Vital Signs (last 24 hours)        0700  07/10 0659 07/10 0700  07/10 2036   Most Recent      Temp (°F)   97.2 -  103.1     97.2 (36.2) 07/10 1957    Heart Rate   64 -  112     68 07/10 1957    Resp   17 -  18     18 07/10 1957    BP   78/52 -  156/43     93/54 07/10 1957    SpO2 (%)   90 -  100     100  07/10 1957        I do not suspect septic shock at this time.  She likely has some unknown unintentional drug overdose causing her low blood pressures.  She is currently afebrile.  I will give her another fluid bolus of 500 mL with normal saline and increase her IV fluid to 125 mill per hour.  Continue the current antibiotics regimen.  Discussed with nursing staff at the bedside.    Master Romo MD  07/10/22  20:36 EDT    Dictated utilizing Dragon dictation.    Electronically signed by Master Romo MD at 07/10/22 2039       Consult Notes (last 24 hours)  Notes from 07/10/22 1137 through 07/11/22 1137   No notes of this type exist for this encounter.

## 2022-07-11 NOTE — NURSING NOTE
Assisted anaesthesia with bedside LP. Patient tolerated well. 4 vials of specimen collected and sent to lab.

## 2022-07-12 ENCOUNTER — APPOINTMENT (OUTPATIENT)
Dept: MRI IMAGING | Facility: HOSPITAL | Age: 52
End: 2022-07-12

## 2022-07-12 LAB
ANION GAP SERPL CALCULATED.3IONS-SCNC: 10.1 MMOL/L (ref 5–15)
BUN SERPL-MCNC: 5 MG/DL (ref 6–20)
BUN/CREAT SERPL: 6.7 (ref 7–25)
CALCIUM SPEC-SCNC: 8.8 MG/DL (ref 8.6–10.5)
CHLORIDE SERPL-SCNC: 107 MMOL/L (ref 98–107)
CO2 SERPL-SCNC: 22.9 MMOL/L (ref 22–29)
CORTIS SERPL-MCNC: 2.6 MCG/DL
CREAT SERPL-MCNC: 0.75 MG/DL (ref 0.57–1)
EGFRCR SERPLBLD CKD-EPI 2021: 95.9 ML/MIN/1.73
GLUCOSE BLDC GLUCOMTR-MCNC: 101 MG/DL (ref 70–130)
GLUCOSE BLDC GLUCOMTR-MCNC: 116 MG/DL (ref 70–130)
GLUCOSE BLDC GLUCOMTR-MCNC: 134 MG/DL (ref 70–130)
GLUCOSE BLDC GLUCOMTR-MCNC: 149 MG/DL (ref 70–130)
GLUCOSE BLDC GLUCOMTR-MCNC: 89 MG/DL (ref 70–130)
GLUCOSE BLDC GLUCOMTR-MCNC: 95 MG/DL (ref 70–130)
GLUCOSE SERPL-MCNC: 95 MG/DL (ref 65–99)
POTASSIUM SERPL-SCNC: 4.4 MMOL/L (ref 3.5–5.2)
SODIUM SERPL-SCNC: 140 MMOL/L (ref 136–145)
VANCOMYCIN SERPL-MCNC: 18.5 MCG/ML (ref 5–40)

## 2022-07-12 PROCEDURE — 25010000002 PIPERACILLIN SOD-TAZOBACTAM PER 1 G: Performed by: NURSE PRACTITIONER

## 2022-07-12 PROCEDURE — 70551 MRI BRAIN STEM W/O DYE: CPT

## 2022-07-12 PROCEDURE — 25010000002 VANCOMYCIN 1 G RECONSTITUTED SOLUTION

## 2022-07-12 PROCEDURE — 25010000002 ENOXAPARIN PER 10 MG: Performed by: NURSE PRACTITIONER

## 2022-07-12 PROCEDURE — 80202 ASSAY OF VANCOMYCIN: CPT

## 2022-07-12 PROCEDURE — 82533 TOTAL CORTISOL: CPT | Performed by: NURSE PRACTITIONER

## 2022-07-12 PROCEDURE — 25010000002 ACYCLOVIR PER 5 MG: Performed by: NURSE PRACTITIONER

## 2022-07-12 PROCEDURE — 80048 BASIC METABOLIC PNL TOTAL CA: CPT

## 2022-07-12 PROCEDURE — 99232 SBSQ HOSP IP/OBS MODERATE 35: CPT | Performed by: NURSE PRACTITIONER

## 2022-07-12 PROCEDURE — 82024 ASSAY OF ACTH: CPT | Performed by: NURSE PRACTITIONER

## 2022-07-12 PROCEDURE — 82962 GLUCOSE BLOOD TEST: CPT

## 2022-07-12 RX ORDER — ENOXAPARIN SODIUM 100 MG/ML
40 INJECTION SUBCUTANEOUS EVERY 24 HOURS
Status: DISCONTINUED | OUTPATIENT
Start: 2022-07-12 | End: 2022-07-13 | Stop reason: HOSPADM

## 2022-07-12 RX ADMIN — SODIUM CHLORIDE 1000 MG: 900 INJECTION, SOLUTION INTRAVENOUS at 08:32

## 2022-07-12 RX ADMIN — ACYCLOVIR SODIUM 580 MG: 50 INJECTION, SOLUTION INTRAVENOUS at 02:48

## 2022-07-12 RX ADMIN — ENOXAPARIN SODIUM 40 MG: 40 INJECTION SUBCUTANEOUS at 14:08

## 2022-07-12 RX ADMIN — ACYCLOVIR SODIUM 580 MG: 50 INJECTION, SOLUTION INTRAVENOUS at 11:02

## 2022-07-12 RX ADMIN — TAZOBACTAM SODIUM AND PIPERACILLIN SODIUM 3.38 G: 375; 3 INJECTION, SOLUTION INTRAVENOUS at 05:48

## 2022-07-12 RX ADMIN — Medication 10 ML: at 20:36

## 2022-07-12 RX ADMIN — ACETAMINOPHEN 650 MG: 325 TABLET ORAL at 08:53

## 2022-07-12 NOTE — PROGRESS NOTES
Pharmacy Consult-Vancomycin Dosing    Lesly Nicholas is a  51 y.o. female receiving vancomycin therapy.     Indication: Sepsis  Consulting Provider: Fransisca Marino APRN    Goal AUC: 400-600 mg/L*hr    Current Antimicrobial Therapy  Anti-Infectives (From admission, onward)      Ordered     Dose/Rate Route Frequency Start Stop    07/11/22 0826  vancomycin 1 g/250 mL 0.9% NS (vial-mate)        Ordering Provider: Meliton Valera RPH    1,000 mg Intravenous Every 12 Hours Scheduled 07/11/22 0915 07/15/22 0859    07/11/22 0714  Pharmacy to Dose Zosyn        Ordering Provider: Meliton Valera RPH     Does not apply Continuous PRN 07/11/22 0710 07/15/22 0709    07/11/22 0714  Pharmacy to dose vancomycin        Ordering Provider: Meliton Valera RPH     Does not apply Continuous PRN 07/11/22 0709 07/15/22 0708    07/10/22 1835  acyclovir (ZOVIRAX) 580 mg in sodium chloride 0.9 % 100 mL IVPB        Ordering Provider: Fransisca Marino APRN    10 mg/kg × 58.3 kg (Adjusted)  over 60 Minutes Intravenous Every 8 Hours 07/11/22 0200 07/25/22 0159    07/10/22 1840  piperacillin-tazobactam (ZOSYN) 3.375 g in iso-osmotic dextrose 50 ml (premix)        Ordering Provider: Fransisca Marino APRN    3.375 g  over 4 Hours Intravenous Every 8 Hours 07/10/22 2100 07/15/22 2059    07/10/22 1633  acyclovir (ZOVIRAX) 580 mg in sodium chloride 0.9 % 100 mL IVPB        Ordering Provider: Sierra Mensah P, PA-C    10 mg/kg × 58.3 kg (Adjusted)  over 60 Minutes Intravenous Every 8 Hours 07/10/22 1634 07/10/22 1901    07/10/22 1359  vancomycin 1500 mg/500 mL 0.9% NS IVPB ([facility])        Ordering Provider: Julián Mensahyssa P, PA-C    1,500 mg Intravenous Once 07/10/22 1401 07/10/22 1708    07/10/22 1359  piperacillin-tazobactam (ZOSYN) 3.375 g in iso-osmotic dextrose 50 ml (premix)        Ordering Provider: Sierra Mensah PA-C    3.375 g  over 30 Minutes Intravenous Once 07/10/22 1401 07/10/22 1503            Labs  Results from last 7 days   Lab Units  07/12/22  0553 07/11/22  0545 07/10/22  1209   WBC 10*3/mm3  --  3.04* 4.32   CREATININE mg/dL 0.75 0.77 1.17*      Estimated Creatinine Clearance: 83.8 mL/min (by C-G formula based on SCr of 0.75 mg/dL).  Temp Readings from Last 1 Encounters:   07/12/22 98.2 °F (36.8 °C) (Oral)       Microbiology Culture results  Microbiology Results (last 10 days)       Procedure Component Value - Date/Time    Gram Stain - No Culture [[id number]] Collected: 07/11/22 1143    Lab Status: Final result Specimen: Body Fluid from CSF [place] Updated: 07/11/22 1251     Gram Stain No WBCs or organisms seen    Blood Culture - Blood, Arm, Right [[id number]]  (Normal) Collected: 07/10/22 1435    Lab Status: Preliminary result Specimen: Blood from Arm, Right Updated: 07/11/22 1504     Blood Culture No growth at 24 hours    Blood Culture - Blood, Hand, Left [[id number]]  (Normal) Collected: 07/10/22 1432    Lab Status: Preliminary result Specimen: Blood from Hand, Left Updated: 07/11/22 1504     Blood Culture No growth at 24 hours    Respiratory Panel PCR w/COVID-19(SARS-CoV-2) ELISHA/DEJA/MELINDA/PAD/COR/MAD/STPEHY In-House, NP Swab in UTM/VTM, 3-4 HR TAT - Swab, Nasopharynx [[id number]]  (Normal) Collected: 07/10/22 1209    Lab Status: Final result Specimen: Swab from Nasopharynx Updated: 07/10/22 1310     ADENOVIRUS, PCR Not Detected     Coronavirus 229E Not Detected     Coronavirus HKU1 Not Detected     Coronavirus NL63 Not Detected     Coronavirus OC43 Not Detected     COVID19 Not Detected     Human Metapneumovirus Not Detected     Human Rhinovirus/Enterovirus Not Detected     Influenza A PCR Not Detected     Influenza B PCR Not Detected     Parainfluenza Virus 1 Not Detected     Parainfluenza Virus 2 Not Detected     Parainfluenza Virus 3 Not Detected     Parainfluenza Virus 4 Not Detected     RSV, PCR Not Detected     Bordetella pertussis pcr Not Detected     Bordetella parapertussis PCR Not Detected     Chlamydophila pneumoniae PCR Not Detected      "Mycoplasma pneumo by PCR Not Detected    Narrative:      In the setting of a positive respiratory panel with a viral infection PLUS a negative procalcitonin without other underlying concern for bacterial infection, consider observing off antibiotics or discontinuation of antibiotics and continue supportive care. If the respiratory panel is positive for atypical bacterial infection (Bordetella pertussis, Chlamydophila pneumoniae, or Mycoplasma pneumoniae), consider antibiotic de-escalation to target atypical bacterial infection.            Evaluation of Dosing     Last Dose Received in the ED/Outside Facility: Yes  Is Patient on Dialysis or Renal Replacement: No    Ht - 149.9 cm (59\")  Wt - 78 kg (171 lb 15.3 oz)    Evaluation of Level    Results from last 7 days   Lab Units 07/12/22  0553 07/11/22  0545   VANCOMYCIN RM mcg/mL 18.50 8.00                   [facility] AUC Calculation    Current AUC:   434 mg/L*hr    Predicted Steady State AUC on Current Dose: 538 mg/L*hr  _________________________________    Predicted Steady State AUC on New Dose: No Change     Assessment/Plan    • Pharmacy to dose vancomycin for sepsis. Goal -600 mg/L*hr.  • Patient receiving vancomycin 1000 mg IV q12h  • Vancomycin random level, collected ~ 9 hours following dose, resulted at 18.5 mcg/mL.   • Will continue with current regimen.  • Pharmacy will continue to monitor renal function, cultures and sensitivities, and clinical status to adjust regimen as necessary.    Thanks,     Meliton Valera, PharmD  7/12/2022 08:35 EDT  "

## 2022-07-12 NOTE — PLAN OF CARE
Problem: Adult Inpatient Plan of Care  Goal: Plan of Care Review  Outcome: Ongoing, Progressing  Goal: Patient-Specific Goal (Individualized)  Outcome: Ongoing, Progressing  Goal: Absence of Hospital-Acquired Illness or Injury  Outcome: Ongoing, Progressing  Intervention: Identify and Manage Fall Risk  Recent Flowsheet Documentation  Taken 7/12/2022 0841 by Cherry Reed RN  Safety Promotion/Fall Prevention:   activity supervised   assistive device/personal items within reach   clutter free environment maintained   nonskid shoes/slippers when out of bed  Intervention: Prevent Skin Injury  Recent Flowsheet Documentation  Taken 7/12/2022 0841 by Cherry Reed RN  Body Position: position changed independently  Skin Protection: incontinence pads utilized  Intervention: Prevent and Manage VTE (Venous Thromboembolism) Risk  Recent Flowsheet Documentation  Taken 7/12/2022 0841 by Cherry Reed RN  Activity Management: activity adjusted per tolerance  VTE Prevention/Management: (see mar) other (see comments)  Intervention: Prevent Infection  Recent Flowsheet Documentation  Taken 7/12/2022 0841 by Cherry Reed RN  Infection Prevention: rest/sleep promoted  Goal: Optimal Comfort and Wellbeing  Outcome: Ongoing, Progressing  Intervention: Provide Person-Centered Care  Recent Flowsheet Documentation  Taken 7/12/2022 0841 by Cherry Reed RN  Trust Relationship/Rapport:   care explained   questions answered  Goal: Readiness for Transition of Care  Outcome: Ongoing, Progressing     Problem: Fever  Goal: Body Temperature in Desired Range  Outcome: Ongoing, Progressing     Problem: Impaired Thinking  Goal: Patient will report diminishing or absence of hallucinations and/or delusions.  Outcome: Ongoing, Progressing     Problem: Fall Injury Risk  Goal: Absence of Fall and Fall-Related Injury  Outcome: Ongoing, Progressing  Intervention: Identify and Manage Contributors  Recent Flowsheet Documentation  Taken 7/12/2022 0841 by Derek  Cherry, RN  Medication Review/Management: medications reviewed  Intervention: Promote Injury-Free Environment  Recent Flowsheet Documentation  Taken 7/12/2022 0841 by Cherry Reed, RN  Safety Promotion/Fall Prevention:   activity supervised   assistive device/personal items within reach   clutter free environment maintained   nonskid shoes/slippers when out of bed   Goal Outcome Evaluation:
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
## 2022-07-12 NOTE — PLAN OF CARE
Goal Outcome Evaluation:  Plan of Care Reviewed With: patient        Progress: improving  Outcome Evaluation: VSS, pt Alert and oriented x4, FSBS stable, c/o headache once during shift that was relieved with PRN tylenol, appeared to rest well between care.

## 2022-07-13 VITALS
TEMPERATURE: 98.2 F | BODY MASS INDEX: 35.07 KG/M2 | DIASTOLIC BLOOD PRESSURE: 97 MMHG | RESPIRATION RATE: 18 BRPM | OXYGEN SATURATION: 98 % | HEART RATE: 81 BPM | HEIGHT: 59 IN | SYSTOLIC BLOOD PRESSURE: 162 MMHG | WEIGHT: 173.94 LBS

## 2022-07-13 PROBLEM — E27.40 ADRENAL INSUFFICIENCY (HCC): Status: ACTIVE | Noted: 2022-07-13

## 2022-07-13 LAB
ACTH PLAS-MCNC: 14.7 PG/ML (ref 7.2–63.3)
ANION GAP SERPL CALCULATED.3IONS-SCNC: 12.5 MMOL/L (ref 5–15)
BUN SERPL-MCNC: 5 MG/DL (ref 6–20)
BUN/CREAT SERPL: 6.6 (ref 7–25)
CALCIUM SPEC-SCNC: 9.3 MG/DL (ref 8.6–10.5)
CHLORIDE SERPL-SCNC: 103 MMOL/L (ref 98–107)
CO2 SERPL-SCNC: 25.5 MMOL/L (ref 22–29)
CREAT SERPL-MCNC: 0.76 MG/DL (ref 0.57–1)
DEPRECATED RDW RBC AUTO: 39.8 FL (ref 37–54)
EGFRCR SERPLBLD CKD-EPI 2021: 94.4 ML/MIN/1.73
ERYTHROCYTE [DISTWIDTH] IN BLOOD BY AUTOMATED COUNT: 12.4 % (ref 12.3–15.4)
GLUCOSE BLDC GLUCOMTR-MCNC: 108 MG/DL (ref 70–130)
GLUCOSE BLDC GLUCOMTR-MCNC: 98 MG/DL (ref 70–130)
GLUCOSE SERPL-MCNC: 94 MG/DL (ref 65–99)
HCT VFR BLD AUTO: 30.4 % (ref 34–46.6)
HGB BLD-MCNC: 10.3 G/DL (ref 12–15.9)
MCH RBC QN AUTO: 29.7 PG (ref 26.6–33)
MCHC RBC AUTO-ENTMCNC: 33.9 G/DL (ref 31.5–35.7)
MCV RBC AUTO: 87.6 FL (ref 79–97)
PLATELET # BLD AUTO: 182 10*3/MM3 (ref 140–450)
PMV BLD AUTO: 9 FL (ref 6–12)
POTASSIUM SERPL-SCNC: 4.1 MMOL/L (ref 3.5–5.2)
RBC # BLD AUTO: 3.47 10*6/MM3 (ref 3.77–5.28)
SODIUM SERPL-SCNC: 141 MMOL/L (ref 136–145)
WBC NRBC COR # BLD: 2.96 10*3/MM3 (ref 3.4–10.8)

## 2022-07-13 PROCEDURE — 85027 COMPLETE CBC AUTOMATED: CPT | Performed by: NURSE PRACTITIONER

## 2022-07-13 PROCEDURE — 99239 HOSP IP/OBS DSCHRG MGMT >30: CPT | Performed by: INTERNAL MEDICINE

## 2022-07-13 PROCEDURE — 80048 BASIC METABOLIC PNL TOTAL CA: CPT | Performed by: NURSE PRACTITIONER

## 2022-07-13 PROCEDURE — 82962 GLUCOSE BLOOD TEST: CPT

## 2022-07-13 PROCEDURE — 84244 ASSAY OF RENIN: CPT | Performed by: NURSE PRACTITIONER

## 2022-07-13 PROCEDURE — 82088 ASSAY OF ALDOSTERONE: CPT | Performed by: NURSE PRACTITIONER

## 2022-07-13 RX ORDER — HYDROCORTISONE 10 MG/1
10 TABLET ORAL 2 TIMES DAILY WITH MEALS
Qty: 60 TABLET | Refills: 1 | Status: SHIPPED | OUTPATIENT
Start: 2022-07-13 | End: 2022-09-11

## 2022-07-13 RX ORDER — HYDROCORTISONE 10 MG/1
10 TABLET ORAL 2 TIMES DAILY WITH MEALS
Status: DISCONTINUED | OUTPATIENT
Start: 2022-07-13 | End: 2022-07-13 | Stop reason: HOSPADM

## 2022-07-13 RX ADMIN — ACETAMINOPHEN 650 MG: 325 TABLET ORAL at 08:40

## 2022-07-13 RX ADMIN — HYDROCORTISONE 10 MG: 10 TABLET ORAL at 11:10

## 2022-07-13 NOTE — PLAN OF CARE
Goal Outcome Evaluation:  Plan of Care Reviewed With: patient        Progress: improving  Outcome Evaluation: VSS.  Pt rested comfortably during shift.  No change in pt condition to report.  Pt has been NPO since midnight.  Will continue to monitor.

## 2022-07-14 NOTE — DISCHARGE SUMMARY
"    [facility]      Name:  Lesly Nicholas   Age:  51 y.o.  Sex:  female  :  1970  MRN:  [id number]   Visit Number:  [id number]    Admission Date:  7/10/2022  Date of Discharge: 2022   primary Care Physician:  Aimee Canela MD        Discharge Diagnoses:     1. Sepsis of unknown origin, - ruled out  2. Adrenal insufficiency  3. Altered Mental Status,-resolved  4. Hyponatremia,   5. Hypoglycemia  6. History of Severe Depression with Questionable Bipolar disorder  7. GERD  8. HTN  9. HLD      Problem List:     Active Hospital Problems    Diagnosis  POA   • **Metabolic encephalopathy [G93.41]  Yes   • Adrenal insufficiency (HCC) [E27.40]  Yes   • Fever [R50.9]  Yes      Resolved Hospital Problems   No resolved problems to display.     Presenting Problem:    Chief Complaint   Patient presents with   • Fever   • Altered Mental Status      Consults:     Consulting Physician(s)             None          Procedures Performed:        History of presenting illness/Hospital Course:    \"51-year-old female presented to the emergency department with her mother due to worsening altered mental status as well as fever.  Pertinent past medical history includes depression and anxiety, suicide attempt, self-mutilation, recent seizure 2 months ago with fall for which patient hit her head CT negative at that time, GERD, hypertension, hyperlipidemia, and insomnia.  Patient history obtained per son and mother as well as patient.  History is very scattered.  Per mother patient has been having episodes where she appears altered for the past several months and even years.  Patient states that her  accuses her of misusing drugs.  She is currently prescribed Xanax for which she states she takes as prescribed.  When asked if patient had ever been diagnosed with bipolar disorder she states yes.  Denies any current thoughts of suicidal or homicidal ideations.  States that " her history of self-mutilation occurred while she was possessed years ago.  Patient currently lives at home with her  and son.  Denies associated history of drug addiction.  Onset of fever a couple of days ago.  States that she was exposed to an ill child recently.  Denies associated cough, dysuria, rash, or shortness of air.  Mother also stated that patient's altered mental status began during menopause and resolved after an ablation.  Mother states that patient does have a history of seizure disorder in the past however had not had recent seizure until fall recently.  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 States also taking magnesium and Calcium supplements which seem to cause worsening confusion.     Upon ED presentation patient noted to have temperature of 103 with other vital signs stable.  Labs mostly unremarkable with urinalysis, chest x-ray, respiratory panel, CBC, within normal limits.  Sodium noted to be 126 with mild elevation of creatinine at 1.1.  Acetaminophen and salicylate levels negative.  UDS positive for benzodiazepines and tricyclic antidepressants.  Ethanol level negative.  CT of head revealed right mastoiditis.  Blood cultures x2 pending.  Procalcitonin and lactate negative.  LP was attempted in the emergency department however was unsuccessful.  Patient was initiated on acyclovir, vancomycin, and Zosyn in the emergency department.  Fever improved with Tylenol and ibuprofen administration.  Prior to patient arriving to floor hypotension noted.  Per RN staff patient somnolent at times with nasal cannula applied.  Hospitalist contacted for further medical management.  Patient mentation markedly improved.  However, was noted to have several episodes of hypoglycemia throughout admission which may be contributing overall to patient's altered mental status at home.  No past medical history of diabetes noted.  Patient remained afebrile otherwise throughout admission.  Blood cultures  "x2 pending without growth to date.\"  Antibiotics were discontinued and patient continued to show significant improvement both in mentation as well as laboratory data.  Given hypotension, hypoglycemia, there was concern regarding adrenal insufficiency.  A.m. morning cortisol was obtained and significantly decreased to 2.6.  MRI showed no acute process, no evidence of mass or infarct.  Patient was started on hydrocortisone and discharged home with referral to establish care with endocrinology as well as follow-up with her primary physician.    Vital Signs:         Physical Exam:    General Appearance:  Alert and cooperative.    Head:  Atraumatic and normocephalic.   Eyes: Conjunctivae and sclerae normal, no icterus. No pallor.   Ears:  Ears with no abnormalities noted.   Throat: No oral lesions, no thrush, oral mucosa moist.   Neck: Supple, trachea midline, no thyromegaly.   Back:   No kyphoscoliosis present. No tenderness to palpation.   Lungs:   Breath sounds heard bilaterally equally.  No crackles or wheezing. No Pleural rub or bronchial breathing.   Heart:  Normal S1 and S2, no murmur, no gallop, no rub. No JVD.   Abdomen:   Normal bowel sounds, no masses, no organomegaly. Soft, nontender, nondistended, no rebound tenderness.   Extremities: Supple, no edema, no cyanosis, no clubbing.   Pulses: Pulses palpable bilaterally.   Skin: No bleeding or rash.   Neurologic: Alert and oriented x 3. No facial asymmetry. Moves all four limbs. No tremors.     Pertinent Lab Results:     Results from last 7 days   Lab Units 07/13/22  0750 07/12/22  0553 07/11/22  0545 07/10/22  1209   SODIUM mmol/L 141 140 140 126*   POTASSIUM mmol/L 4.1 4.4 3.9 4.5   CHLORIDE mmol/L 103 107 109* 93*   CO2 mmol/L 25.5 22.9 19.3* 22.0   BUN mg/dL 5* 5* 10 15   CREATININE mg/dL 0.76 0.75 0.77 1.17*   CALCIUM mg/dL 9.3 8.8 8.0* 8.8   BILIRUBIN mg/dL  --   --   --  0.2   ALK PHOS U/L  --   --   --  84   ALT (SGPT) U/L  --   --   --  15   AST (SGOT) " U/L  --   --   --  22   GLUCOSE mg/dL 94 95 45* 70     Results from last 7 days   Lab Units 07/13/22  0750 07/11/22  0545 07/10/22  1209   WBC 10*3/mm3 2.96* 3.04* 4.32   HEMOGLOBIN g/dL 10.3* 9.2* 11.1*   HEMATOCRIT % 30.4* 27.3* 31.8*   PLATELETS 10*3/mm3 182 133* 152         Results from last 7 days   Lab Units 07/10/22  1209   TROPONIN T ng/mL <0.010             Results from last 7 days   Lab Units 07/10/22  1209   LIPASE U/L 45         Results from last 7 days   Lab Units 07/10/22  1435 07/10/22  1432   BLOODCX  No growth at 3 days No growth at 3 days       Pertinent Radiology Results:    Imaging Results (All)     Procedure Component Value Units Date/Time    MRI Brain Without Contrast [[id number]] Collected: 07/12/22 1620     Updated: 07/12/22 1631    Narrative:      PROCEDURE: MRI BRAIN WO CONTRAST-     HISTORY: Altered mental status, nontraumatic (Ped 0-17y); R50.9-Fever,  unspecified; R41.82-Altered mental status, unspecified;  H70.91-Unspecified mastoiditis, right ear; E87.3-Uhhv-ljuejkvjhx and  hyponatremia; G04.90-Encephalitis and encephalomyelitis, unspecified     TECHNIQUE: Multiplanar imaging was performed of the brain without  gadolinium infusion.     Diffusion sequences show no signal abnormalities to indicate acute  infarct or other process.     Brain parenchyma displays normal signal without evidence of mass,  hemorrhage or edema. No extra-axial abnormal findings are identified.  The ventricles and cisterns appear normal.     Fluid is noted within the right mastoid air cells which may be seen with  mastoiditis. Correlate with clinical presentation.       Impression:      1. No evidence of mass or acute infarct.  2. Fluid within right mastoid air cells which may be seen with  mastoiditis.     This report was signed and finalized on 7/12/2022 4:29 PM by Roberth Ayers MD.    CT Head Without Contrast [[id number]] Collected: 07/10/22 1338     Updated: 07/10/22 1342    Narrative:      PROCEDURE: CT HEAD  WO CONTRAST-     INDICATION: Altered mental status, fever     TECHNIQUE: Multiple axial CT images were performed from the foramen  magnum to the vertex without contrast.   Coronal reconstruction images  were obtained from the axial data.     COMPARISON: None.     FINDINGS: There is no mass effect or midline shift.  The ventricles are  symmetric in size and configuration.  There is no hydrocephalus.  There  are no extraaxial fluid collections.  There is no intraventricular or  intraparenchymal hemorrhage.  The posterior fossa has a normal CT  appearance.  There is fluid in the right mastoid air cells. There is no  air-fluid level in the paranasal sinuses. No acute osseous abnormalities  are present.       Impression:      No mass effect, midline shift, or intracranial hemorrhage..     Right mastoiditis.                    705.80 mGy.cm           This study was performed with techniques to keep radiation doses as low  as reasonably achievable (ALARA). Individualized dose reduction  techniques using automated exposure control or adjustment of mA and/or  kV according to the patient size were employed.      This report was signed and finalized on 7/10/2022 1:40 PM by Radha Chen MD.    XR Chest 1 View [[id number]] Collected: 07/10/22 1220     Updated: 07/10/22 1223    Narrative:      PROCEDURE: XR CHEST 1 VW-     INDICATION:  AMS     FINDINGS:  A portable view of the chest was obtained.  Comparison is  made to a prior exam dated 07/25/2018.   The cardiac and mediastinal  silhouettes are within normal limits.  The lungs are clear.  There is no  pleural effusion or pneumothorax.  No acute osseous abnormality is  identified.           Impression:      No acute process on this portable exam.     This report was signed and finalized on 7/10/2022 12:21 PM by Radha Chen MD.          Echo:    Results for orders placed in visit on 10/09/18    Adult Transthoracic Echo Complete W/ Cont if Necessary Per  Protocol    Interpretation Summary  · Mild tricuspid valve regurgitation is present.  · Left ventricular systolic function is normal. Estimated EF = 60%.    Condition on Discharge:      Stable.    Code status during the hospital stay:    Code Status and Medical Interventions:   Ordered at: 07/10/22 6213     Level Of Support Discussed With:    Health Care Surrogate    Patient     Code Status (Patient has no pulse and is not breathing):    CPR (Attempt to Resuscitate)     Medical Interventions (Patient has pulse or is breathing):    Full Support     Discharge Disposition:    Home or Self Care    Discharge Medications:       Discharge Medications      New Medications      Instructions Start Date   hydrocortisone 10 MG tablet  Commonly known as: CORTEF   10 mg, Oral, 2 Times Daily With Meals         Continue These Medications      Instructions Start Date   ALPRAZolam 1 MG tablet  Commonly known as: XANAX   1 mg, Oral, 2 Times Daily      fish oil 1000 MG capsule capsule   Oral, Daily With Breakfast      FLUoxetine 40 MG capsule  Commonly known as: PROzac   80 mg, Oral, Daily      lisinopril 20 MG tablet  Commonly known as: PRINIVIL,ZESTRIL   20 mg, Oral, Daily      metoprolol succinate XL 25 MG 24 hr tablet  Commonly known as: TOPROL-XL   25 mg, Oral, Nightly      pantoprazole 40 MG EC tablet  Commonly known as: PROTONIX   40 mg, Oral, Daily         Stop These Medications    acyclovir 400 MG tablet  Commonly known as: ZOVIRAX     albuterol sulfate  (90 Base) MCG/ACT inhaler  Commonly known as: PROVENTIL HFA;VENTOLIN HFA;PROAIR HFA     clonazePAM 1 MG tablet  Commonly known as: KlonoPIN     NON FORMULARY     NON FORMULARY     SM POTASSIUM PO          Discharge Diet:     Diet Instructions     Diet: Cardiac      Discharge Diet: Cardiac        Activity at Discharge:     Activity Instructions     Activity as Tolerated          Follow-up Appointments:    Additional Instructions for the Follow-ups that You Need to  Schedule     Discharge Follow-up with PCP   As directed       Currently Documented PCP:    Aimee Canela MD    PCP Phone Number:    [phone]     Follow Up Details: 1-2 weeks            Follow-up Information     Aimee Canela MD Follow up on 8/12/2022.    Specialty: Internal Medicine  Why: @ 2:20 PM  Contact information:  [address] [facility] [address]  [phone]             Carlie Aguilar MD .    Specialty: Endocrinology  Why: The office will contact you with your appointment information.  Contact information:  [facility] [facility] [address]  [facility] [address]  [phone]             Aimee Canela MD .    Specialty: Internal Medicine  Contact information:  [address] [facility] [address]  [phone]                       Future Appointments   Date Time Provider Department Center   8/11/2022  9:00 AM Allison Brooks APRN [facility]     Test Results Pending at Discharge:    Pending Labs     Order Current Status    Aldosterone In process    Renin Direct Assay In process    Blood Culture - Blood, Arm, Right Preliminary result    Blood Culture - Blood, Hand, Left Preliminary result             Robert Mckee DO  07/14/22  14:19 EDT    Time: I spent >30 minutes on this discharge activity which included: face-to-face encounter with the patient, reviewing the data in the system, coordination of the care with the nursing staff as well as consultants, documentation, and entering orders.     Dictated utilizing Dragon dictation.

## 2022-07-15 LAB
BACTERIA SPEC AEROBE CULT: NORMAL
BACTERIA SPEC AEROBE CULT: NORMAL

## 2022-07-19 LAB — ALDOST SERPL-MCNC: 3.9 NG/DL (ref 0–30)

## 2022-07-21 LAB — RENIN PLAS-CCNC: <0.167 NG/ML/HR (ref 0.17–5.38)

## 2022-08-11 ENCOUNTER — OFFICE VISIT (OUTPATIENT)
Dept: NEUROLOGY | Facility: CLINIC | Age: 52
End: 2022-08-11

## 2022-08-11 VITALS
OXYGEN SATURATION: 98 % | BODY MASS INDEX: 31.25 KG/M2 | HEART RATE: 71 BPM | SYSTOLIC BLOOD PRESSURE: 126 MMHG | HEIGHT: 59 IN | WEIGHT: 155 LBS | TEMPERATURE: 96.9 F | DIASTOLIC BLOOD PRESSURE: 82 MMHG

## 2022-08-11 DIAGNOSIS — F39 MOOD DISORDER: ICD-10-CM

## 2022-08-11 DIAGNOSIS — G40.909 SEIZURE DISORDER: Primary | ICD-10-CM

## 2022-08-11 DIAGNOSIS — G47.09 OTHER INSOMNIA: ICD-10-CM

## 2022-08-11 DIAGNOSIS — Z87.820 HISTORY OF CLOSED HEAD INJURY: ICD-10-CM

## 2022-08-11 PROCEDURE — 99213 OFFICE O/P EST LOW 20 MIN: CPT | Performed by: NURSE PRACTITIONER

## 2022-08-11 RX ORDER — LAMOTRIGINE 25 MG/1
50 TABLET ORAL 2 TIMES DAILY
Qty: 120 TABLET | Refills: 0 | Status: SHIPPED | OUTPATIENT
Start: 2022-08-11 | End: 2022-10-12

## 2022-08-11 RX ORDER — LAMOTRIGINE 25 MG/1
TABLET ORAL
Qty: 42 TABLET | Refills: 0 | Status: SHIPPED | OUTPATIENT
Start: 2022-08-11 | End: 2022-10-12

## 2022-08-11 RX ORDER — AMITRIPTYLINE HYDROCHLORIDE 25 MG/1
TABLET, FILM COATED ORAL
COMMUNITY
Start: 2022-05-30 | End: 2022-10-12

## 2022-08-11 RX ORDER — METOPROLOL SUCCINATE 25 MG/1
25 TABLET, EXTENDED RELEASE ORAL DAILY
COMMUNITY

## 2022-08-11 NOTE — PROGRESS NOTES
"     New Patient Office Visit      Patient Name: Lesly Nicholas  : 1970   MRN: [id number]     Referring Physician: Aimee Canela MD    Chief Complaint:    Chief Complaint   Patient presents with   • Consult     New patient in office to establish care for Epilepsy. Patient states first seizure was when she was 23. Last seizure was in May 2022.       History of Present Illness: Lesly Nicholas is a 51 y.o. female who is here today to establish care with Neurology for history of seizures.  She is accompanied by her son, Anthony, today.  She had her first seizure when she was about 23 years old- she refers to it as an episode and states, \"Whatever that thing was, I don't know what it was\"- she describes an episode of tremors accompanied by contraction of hands and no loss of consciousness.  She says she has grand mal seizures and has had 3 of these with the most recent being in May 2022.  Her son describes her most recent seizure as her walking, falling backwards and \"started having a seizure\"- she had tonin-clonic activity, turned blue, lasting about 2 minutes, 911 was called and she was taken to the emergency department.  She had vomiting and had a very bad headache after the episode.  Her  told her she hit her head on concrete during the seizure.  She was in [place] at the time, of her most recent seizure, and she was prescribed Keppra, which she only took for a few days.  Her most recent seizure prior to this one was in .  She graduated from high school.  She was the product of a full term birth.  She uses alcohol occassionally.  She denies any illegal drug use.  She has a history of a couple of closed head injuries without loss of consciousness.  Additional risk factors- BMI 31, mood disorder, GERD, HTN, insomnia, colitis, personal history of COVID-19 (2021), adrenal insufficiency.   *Says she previously saw Dr. Terrence Sanz, neurologist, and another [facility]ist at [facility] in [place].   *Hospital discharge note, from July 2022, reviewed at time of visit.  Patient presented to the ED with altered mental status and fever- admitted with sepsis and metabolic encephalopathy.   *Previous visit note (10/8/2010) from Dr. Garcia, epileptologist, reviewed at time of visit.  He did not see any evidence for the diagnosis of epilepsy during continuous EEG recording.  He suggested follow up with a therapist.  It was also noted she had tried Topiramate, Depakote ER, Clonazepam, and Lamictal and all were discontinued at some point due to intolerance.     Pertinent Medical History:  MRI brain without contrast on 7/12/2022 was noncontributory.  CT head without contrast was noncontributory on 7/10/2022.  She has taken Depakote and Keppra in the past and did not tolerate either well.      Subjective      Review of Systems:   Review of Systems   Constitutional: Negative for fatigue, fever, unexpected weight gain and unexpected weight loss.   HENT: Negative for hearing loss, sore throat, swollen glands, tinnitus and trouble swallowing.    Eyes: Negative for blurred vision, double vision, photophobia and visual disturbance.   Respiratory: Negative for cough, chest tightness and shortness of breath.    Cardiovascular: Negative for chest pain, palpitations and leg swelling.   Gastrointestinal: Negative for constipation, diarrhea and nausea.   Endocrine: Negative for cold intolerance and heat intolerance.   Musculoskeletal: Negative for gait problem, neck pain and neck stiffness.   Skin: Negative for color change and rash.   Allergic/Immunologic: Negative for environmental allergies and food allergies.   Neurological: Positive for seizures. Negative for dizziness, syncope, facial asymmetry, speech difficulty, weakness, headache, memory problem and confusion.   Psychiatric/Behavioral: Negative for agitation, behavioral problems and depressed mood. The patient is not nervous/anxious.         Past Medical History:   Past Medical History:   Diagnosis Date   • Anxiety    • Arthritis    • Depression    • GERD (gastroesophageal reflux disease)    • Hyperlipemia    • Hypertension    • IBS (irritable bowel syndrome)    • Kidney disease    • Kidney stone    • Menometrorrhagia 2011   • Osteoporosis    • Reflux esophagitis    • Urinary tract infection        Past Surgical History:   Past Surgical History:   Procedure Laterality Date   • CHOLECYSTECTOMY     • COLONOSCOPY N/A 6/7/2021    Procedure: COLONOSCOPY;  Surgeon: Anna Vann MD;  Location: [facility] ENDOSCOPY;  Service: Gastroenterology;  Laterality: N/A;   • D & C HYSTEROSCOPY ENDOMETRIAL ABLATION  12/29/2011    NOVASURE ABLATION (DR MILLER)   • GALLBLADDER SURGERY     • TUBAL ABDOMINAL LIGATION     • WISDOM TOOTH EXTRACTION         Family History:   Family History   Problem Relation Age of Onset   • Hypertension Father    • Kidney disease Father    • Hypertension Mother    • Osteoporosis Paternal Grandmother    • Coronary artery disease Paternal Uncle    • Heart attack Paternal Uncle    • Breast cancer Neg Hx        Social History:   Social History     Socioeconomic History   • Marital status:    Tobacco Use   • Smoking status: Former Smoker   • Smokeless tobacco: Never Used   Vaping Use   • Vaping Use: Never used   Substance and Sexual Activity   • Alcohol use: No   • Drug use: No   • Sexual activity: Yes     Partners: Male       Medications:     Current Outpatient Medications:   •  ALPRAZolam (XANAX) 1 MG tablet, Take 1 mg by mouth 2 (Two) Times a Day., Disp: , Rfl:   •  amitriptyline (ELAVIL) 25 MG tablet, , Disp: , Rfl:   •  Calcium-Magnesium-Vitamin D (CALCIUM MAGNESIUM PO), Take  by mouth., Disp: , Rfl:   •  FLUoxetine (PROzac) 40 MG capsule, Take 80 mg by mouth Daily., Disp: , Rfl:   •  hydrocortisone (CORTEF) 10 MG tablet, Take 1 tablet by mouth 2 (Two) Times a Day With Meals for 60 days., Disp: 60 tablet, Rfl: 1  •  lisinopril  "(PRINIVIL,ZESTRIL) 20 MG tablet, Take 20 mg by mouth Daily., Disp: , Rfl:   •  metoprolol succinate XL (TOPROL-XL) 25 MG 24 hr tablet, Take 25 mg by mouth Daily., Disp: , Rfl:   •  Omega-3 Fatty Acids (fish oil) 1000 MG capsule capsule, Take  by mouth Daily With Breakfast., Disp: , Rfl:   •  pantoprazole (PROTONIX) 40 MG EC tablet, Take 40 mg by mouth Daily., Disp: , Rfl:   •  lamoTRIgine (LaMICtal) 25 MG tablet, Take 1 tablet by mouth Daily for 14 days, THEN 1 tablet 2 (Two) Times a Day for 14 days., Disp: 42 tablet, Rfl: 0  •  lamoTRIgine (LaMICtal) 25 MG tablet, Take 2 tablets by mouth 2 (Two) Times a Day., Disp: 120 tablet, Rfl: 0  •  metoprolol succinate XL (TOPROL-XL) 25 MG 24 hr tablet, Take 1 tablet by mouth Every Night for 360 days., Disp: 30 tablet, Rfl: 11    Allergies:   Allergies   Allergen Reactions   • Seroquel [Quetiapine Fumarate] Anaphylaxis   • Trazodone And Nefazodone Anaphylaxis   • Wasp Venom Anaphylaxis   • Atorvastatin Myalgia       Objective     Physical Exam:  Vital Signs:   Vitals:    08/11/22 0834   BP: 126/82   BP Location: Left arm   Patient Position: Sitting   Cuff Size: Adult   Pulse: 71   Temp: 96.9 °F (36.1 °C)   TempSrc: Infrared   SpO2: 98%   Weight: 70.3 kg (155 lb)   Height: 149.9 cm (59\")     Body mass index is 31.31 kg/m².     Physical Exam  Vitals and nursing note reviewed.   Constitutional:       General: She is not in acute distress.     Appearance: Normal appearance. She is well-developed. She is not diaphoretic.   HENT:      Head: Normocephalic and atraumatic.   Eyes:      Extraocular Movements: Extraocular movements intact.      Conjunctiva/sclera: Conjunctivae normal.      Pupils: Pupils are equal, round, and reactive to light.   Cardiovascular:      Rate and Rhythm: Normal rate and regular rhythm.      Heart sounds: Normal heart sounds. No murmur heard.    No friction rub. No gallop.   Pulmonary:      Effort: Pulmonary effort is normal. No respiratory distress.      " Breath sounds: Normal breath sounds. No wheezing or rales.   Musculoskeletal:         General: Normal range of motion.   Skin:     General: Skin is warm and dry.      Findings: No rash.   Neurological:      General: No focal deficit present.      Mental Status: She is alert and oriented to person, place, and time.      Coordination: Finger-Nose-Finger Test normal.      Gait: Gait is intact.   Psychiatric:         Mood and Affect: Mood normal.         Speech: Speech normal.         Behavior: Behavior normal.         Thought Content: Thought content normal.         Judgment: Judgment normal.         Neurologic Exam     Mental Status   Oriented to person, place, and time.   Attention: normal. Concentration: normal.   Speech: speech is normal   Level of consciousness: alert  Knowledge: good.     Cranial Nerves   Cranial nerves II through XII intact.     CN II   Visual fields full to confrontation.     CN III, IV, VI   Pupils are equal, round, and reactive to light.  Right pupil: Size: 3 mm. Shape: regular. Reactivity: brisk.   Left pupil: Size: 3 mm. Shape: regular. Reactivity: brisk.   CN III: no CN III palsy  CN VI: no CN VI palsy  Nystagmus: none     CN V   Facial sensation intact.     CN VII   Facial expression full, symmetric.     CN VIII   CN VIII normal.     CN IX, X   CN IX normal.   CN X normal.     CN XI   CN XI normal.     CN XII   CN XII normal.     Motor Exam   Muscle bulk: normal  Overall muscle tone: normal    Strength   Right biceps: 5/5  Left biceps: 5/5  Right triceps: 5/5  Left triceps: 5/5  Right quadriceps: 5/5  Left quadriceps: 5/5  Right hamstrin/5  Left hamstrin/5    Sensory Exam   Light touch normal.   Proprioception normal.     Gait, Coordination, and Reflexes     Gait  Gait: normal    Coordination   Finger to nose coordination: normal    Tremor   Resting tremor: absent  Intention tremor: absent  Action tremor: absent    Reflexes   Reflexes 2+ except as noted.       Assessment / Plan       Assessment/Plan:   Diagnoses and all orders for this visit:    1. Seizure disorder (HCC) (Primary)  -     EEG; Future  -     lamoTRIgine (LaMICtal) 25 MG tablet; Take 1 tablet by mouth Daily for 14 days, THEN 1 tablet 2 (Two) Times a Day for 14 days.  Dispense: 42 tablet; Refill: 0  -     lamoTRIgine (LaMICtal) 25 MG tablet; Take 2 tablets by mouth 2 (Two) Times a Day.  Dispense: 120 tablet; Refill: 0    2. History of closed head injury    3. Other insomnia    4. BMI 31.0-31.9,adult    5. Mood disorder (HCC)  -     lamoTRIgine (LaMICtal) 25 MG tablet; Take 1 tablet by mouth Daily for 14 days, THEN 1 tablet 2 (Two) Times a Day for 14 days.  Dispense: 42 tablet; Refill: 0  -     lamoTRIgine (LaMICtal) 25 MG tablet; Take 2 tablets by mouth 2 (Two) Times a Day.  Dispense: 120 tablet; Refill: 0       *Patient education on seizures provided today. I have advised patient no driving for 90 days after a seizure per [place] Driving Laws. Seizure precautions discussed and in place.   *Previous records requested from Dr. DALIA Garcia's office for review.   *I have educated the patient on SUDEP.   *She is agreeable to a trial of an AED as long as it isn't Keppra or Depakote.   *Indications and possible SEs of Lamictal discussed with patient. I have advised her to stop the medication immediately if she notices any generalized rash and notify provider.     Follow Up:   Return in 2 months (on 10/11/2022) for Follow Up.    NADINE Lopez, FNP-C  [facility] Neurology and Sleep Medicine       Please note that portions of this note may have been completed with a voice recognition program. Efforts were made to edit the dictations, but occasionally words are mistranscribed.

## 2022-08-18 ENCOUNTER — HOSPITAL ENCOUNTER (OUTPATIENT)
Dept: SLEEP MEDICINE | Facility: HOSPITAL | Age: 52
Discharge: HOME | End: 2022-08-18
Admitting: NURSE PRACTITIONER

## 2022-08-18 DIAGNOSIS — G40.909 SEIZURE DISORDER: ICD-10-CM

## 2022-08-18 PROCEDURE — 95816 EEG AWAKE AND DROWSY: CPT

## 2022-08-23 ENCOUNTER — TELEPHONE (OUTPATIENT)
Dept: NEUROLOGY | Facility: CLINIC | Age: 52
End: 2022-08-23

## 2022-08-23 NOTE — TELEPHONE ENCOUNTER
Provider: JEROME  Caller: PATIENT   Relationship to Patient: SELF  Pharmacy: [id number]  Phone Number: [phone]  Reason for Call: PATIENT IS EXPERIENCING SIDE EFFECTS- WORSENED DEPRESSION & ANXIETY, BURNING EYES, INSOMNIA- DUE TO LAMOTRIGINE( LAMICTAL) 25 MG.     PLEASE CALL PATIENT & ADVISE    THANK YOU

## 2022-09-12 ENCOUNTER — TELEPHONE (OUTPATIENT)
Dept: NEUROLOGY | Facility: CLINIC | Age: 52
End: 2022-09-12

## 2022-09-12 RX ORDER — HYDROCORTISONE 10 MG/1
10 TABLET ORAL 2 TIMES DAILY WITH MEALS
Qty: 60 TABLET | Refills: 1 | Status: CANCELLED | OUTPATIENT
Start: 2022-09-12 | End: 2022-11-11

## 2022-09-12 NOTE — TELEPHONE ENCOUNTER
Patient notified. She stated she was having so many side effects from the medication she might not take any more anyway. She stated her PCP will not refill this medication either,        Patient asked for the results of her EEG I don't see that those were resulted.

## 2022-09-12 NOTE — TELEPHONE ENCOUNTER
Caller: Lesly Nicholas    Relationship: Self    Best call back number:[phone]    Requested Prescriptions:   Requested Prescriptions     Pending Prescriptions Disp Refills   • hydrocortisone (CORTEF) 10 MG tablet 60 tablet 1     Sig: Take 1 tablet by mouth 2 (Two) Times a Day With Meals for 60 days.        Pharmacy where request should be sent: [id number] [phone]    Additional details provided by patient: PATIENT WOULD LIKE RX WRITTEN TO COVER HER UNTIL HER APPT 10/12/22 AS SHE IS COMPLETELY OUT OF MEDS    Does the patient have less than a 3 day supply:  [x] Yes  [] No    Anmol Michaels   09/12/22 09:03 EDT

## 2022-10-12 ENCOUNTER — OFFICE VISIT (OUTPATIENT)
Dept: NEUROLOGY | Facility: CLINIC | Age: 52
End: 2022-10-12

## 2022-10-12 VITALS
HEIGHT: 59 IN | WEIGHT: 156.2 LBS | OXYGEN SATURATION: 97 % | SYSTOLIC BLOOD PRESSURE: 122 MMHG | DIASTOLIC BLOOD PRESSURE: 90 MMHG | HEART RATE: 71 BPM | TEMPERATURE: 96.8 F | BODY MASS INDEX: 31.49 KG/M2

## 2022-10-12 DIAGNOSIS — F44.5 PSYCHOGENIC NONEPILEPTIC SEIZURE: Primary | ICD-10-CM

## 2022-10-12 PROCEDURE — 99214 OFFICE O/P EST MOD 30 MIN: CPT | Performed by: NURSE PRACTITIONER

## 2022-10-12 NOTE — PROGRESS NOTES
"     Follow Up Office Visit      Patient Name: Lesly Nicholas  : 1970   MRN: [id number]     Chief Complaint:    Chief Complaint   Patient presents with   • Follow-up     Patient in office to follow up on seizures.          History of Present Illness: Lesly Nicholas is a 51 y.o. female who is here today to follow up with history of seizures and was last seen on 2022.  She is unaccompanied today.  She was prescribed Lamictal, at her previous visit, but experienced negative side effects so stopped that.  She has identified lack of sleep as a trigger for her seizures.  She denies any seizure-like activity or episodes of loss of time since her previous visit.  She has a pending appointment with endocrinology regarding \"my cortisol level being low and my adrenal glands not producing cortisol or something\".    *EEG on 2022 was normal.      Following taken from previous visit note:  Lesly Nicholas is a 51 y.o. female who is here today to establish care with Neurology for history of seizures.  She is accompanied by her son, Anthony, today.  She had her first seizure when she was about 23 years old- she refers to it as an episode and states, \"Whatever that thing was, I don't know what it was\"- she describes an episode of tremors accompanied by contraction of hands and no loss of consciousness.  She says she has grand mal seizures and has had 3 of these with the most recent being in May 2022.  Her son describes her most recent seizure as her walking, falling backwards and \"started having a seizure\"- she had tonin-clonic activity, turned blue, lasting about 2 minutes, 911 was called and she was taken to the emergency department.  She had vomiting and had a very bad headache after the episode.  Her  told her she hit her head on concrete during the seizure.  She was in [place] at the time, of her most recent seizure, and she was prescribed Keppra, which she only took for a few days.  Her most " recent seizure prior to this one was in 2007.  She graduated from high school.  She was the product of a full term birth.  She uses alcohol occassionally.  She denies any illegal drug use.  She has a history of a couple of closed head injuries without loss of consciousness.  Additional risk factors- BMI 31, mood disorder, GERD, HTN, insomnia, colitis, personal history of COVID-19 (11/2021), adrenal insufficiency.   *Says she previously saw Dr. Terrence Sanz, neurologist, and another neurologist at [facility] in [place].   *Hospital discharge note, from July 2022, reviewed at time of visit.  Patient presented to the ED with altered mental status and fever- admitted with sepsis and metabolic encephalopathy.   *Previous visit note (10/8/2010) from Dr. Garcia, epileptologist, reviewed at time of visit.  He did not see any evidence for the diagnosis of epilepsy during continuous EEG recording.  He suggested follow up with a therapist.  It was also noted she had tried Topiramate, Depakote ER, Clonazepam, and Lamictal and all were discontinued at some point due to intolerance.     Subjective      Review of Systems:   Review of Systems   Constitutional: Negative for chills, fatigue and fever.   HENT: Negative for facial swelling, hearing loss, sore throat, tinnitus and trouble swallowing.    Eyes: Negative for blurred vision, double vision, photophobia and visual disturbance.   Respiratory: Negative for cough, chest tightness and shortness of breath.    Cardiovascular: Negative for chest pain, palpitations and leg swelling.   Gastrointestinal: Negative for abdominal pain, nausea and vomiting.   Endocrine: Negative for cold intolerance and heat intolerance.   Musculoskeletal: Negative for gait problem, neck pain and neck stiffness.   Skin: Negative for color change and rash.   Allergic/Immunologic: Negative for environmental allergies and food allergies.   Neurological: Positive for seizures. Negative for  "dizziness, syncope, speech difficulty, weakness, light-headedness, numbness, headache and memory problem.   Psychiatric/Behavioral: Negative for behavioral problems, sleep disturbance and depressed mood. The patient is not nervous/anxious.        I have reviewed and the following portions of the patient's history were updated as appropriate: past family history, past medical history, past social history, past surgical history and problem list.    Medications:     Current Outpatient Medications:   •  ALPRAZolam (XANAX) 1 MG tablet, Take 1 mg by mouth 2 (Two) Times a Day., Disp: , Rfl:   •  FLUoxetine (PROzac) 40 MG capsule, Take 80 mg by mouth Daily., Disp: , Rfl:   •  lisinopril (PRINIVIL,ZESTRIL) 20 MG tablet, Take 20 mg by mouth Daily., Disp: , Rfl:   •  metoprolol succinate XL (TOPROL-XL) 25 MG 24 hr tablet, Take 25 mg by mouth Daily., Disp: , Rfl:   •  Omega-3 Fatty Acids (fish oil) 1000 MG capsule capsule, Take  by mouth Daily With Breakfast., Disp: , Rfl:   •  pantoprazole (PROTONIX) 40 MG EC tablet, Take 40 mg by mouth Daily., Disp: , Rfl:   •  metoprolol succinate XL (TOPROL-XL) 25 MG 24 hr tablet, Take 1 tablet by mouth Every Night for 360 days., Disp: 30 tablet, Rfl: 11    Allergies:   Allergies   Allergen Reactions   • Seroquel [Quetiapine Fumarate] Anaphylaxis   • Trazodone And Nefazodone Anaphylaxis   • Wasp Venom Anaphylaxis   • Atorvastatin Myalgia       Objective     Physical Exam:  Vital Signs:   Vitals:    10/12/22 1333   BP: 122/90   BP Location: Right arm   Patient Position: Sitting   Cuff Size: Adult   Pulse: 71   Temp: 96.8 °F (36 °C)   SpO2: 97%   Weight: 70.9 kg (156 lb 3.2 oz)   Height: 149.9 cm (59\")   PainSc:   5   PainLoc: Back     Body mass index is 31.55 kg/m².    Physical Exam  Vitals and nursing note reviewed.   Constitutional:       General: She is not in acute distress.     Appearance: Normal appearance. She is well-developed. She is obese. She is not diaphoretic.   HENT:      Head: " Normocephalic and atraumatic.   Eyes:      Extraocular Movements: Extraocular movements intact.      Conjunctiva/sclera: Conjunctivae normal.      Pupils: Pupils are equal, round, and reactive to light.   Pulmonary:      Effort: Pulmonary effort is normal. No respiratory distress.   Musculoskeletal:         General: Normal range of motion.   Skin:     General: Skin is dry.      Findings: No rash.   Neurological:      Mental Status: She is alert and oriented to person, place, and time.   Psychiatric:         Mood and Affect: Mood normal.         Behavior: Behavior normal.         Thought Content: Thought content normal.         Judgment: Judgment normal.         Neurologic Exam     Mental Status   Oriented to person, place, and time.     Cranial Nerves     CN III, IV, VI   Pupils are equal, round, and reactive to light.       Assessment / Plan      Assessment/Plan:   Diagnoses and all orders for this visit:    1. Psychogenic nonepileptic seizure (Primary)    2. BMI 31.0-31.9,adult    *Patient education on managing psychogenic nonepileptic spells.   *Seizure precautions discussed and encouraged.      Follow Up:   Return if symptoms worsen or fail to improve.    NADINE Lopez, FNP-C  [facility] Neurology and Sleep Medicine       Please note that portions of this note may have been completed with a voice recognition program. Efforts were made to edit the dictations, but occasionally words are mistranscribed.

## 2022-10-25 ENCOUNTER — OFFICE VISIT (OUTPATIENT)
Dept: ENDOCRINOLOGY | Facility: CLINIC | Age: 52
End: 2022-10-25

## 2022-10-25 VITALS
BODY MASS INDEX: 30.82 KG/M2 | SYSTOLIC BLOOD PRESSURE: 130 MMHG | HEIGHT: 60 IN | DIASTOLIC BLOOD PRESSURE: 80 MMHG | HEART RATE: 84 BPM | OXYGEN SATURATION: 98 % | WEIGHT: 157 LBS

## 2022-10-25 DIAGNOSIS — R68.89 ABNORMAL ENDOCRINE LABORATORY TEST FINDING: Primary | ICD-10-CM

## 2022-10-25 PROCEDURE — 96372 THER/PROPH/DIAG INJ SC/IM: CPT | Performed by: INTERNAL MEDICINE

## 2022-10-25 PROCEDURE — 99203 OFFICE O/P NEW LOW 30 MIN: CPT | Performed by: INTERNAL MEDICINE

## 2022-10-25 RX ORDER — BIOTIN 2500 MCG
CAPSULE ORAL
COMMUNITY

## 2022-10-27 ENCOUNTER — TRANSCRIBE ORDERS (OUTPATIENT)
Dept: ADMINISTRATIVE | Facility: HOSPITAL | Age: 52
End: 2022-10-27

## 2022-10-27 DIAGNOSIS — Z12.31 VISIT FOR SCREENING MAMMOGRAM: Primary | ICD-10-CM

## 2022-11-04 ENCOUNTER — LAB (OUTPATIENT)
Dept: ENDOCRINOLOGY | Facility: CLINIC | Age: 52
End: 2022-11-04

## 2022-11-04 ENCOUNTER — LAB (OUTPATIENT)
Dept: LAB | Facility: HOSPITAL | Age: 52
End: 2022-11-04

## 2022-11-04 DIAGNOSIS — R68.89 ABNORMAL ENDOCRINE LABORATORY TEST FINDING: ICD-10-CM

## 2022-11-04 LAB
ALBUMIN SERPL-MCNC: 3.8 G/DL (ref 3.5–5.2)
ALBUMIN/GLOB SERPL: 1.6 G/DL
ALP SERPL-CCNC: 55 U/L (ref 39–117)
ALT SERPL W P-5'-P-CCNC: 8 U/L (ref 1–33)
ANION GAP SERPL CALCULATED.3IONS-SCNC: 7.5 MMOL/L (ref 5–15)
AST SERPL-CCNC: 16 U/L (ref 1–32)
BILIRUB SERPL-MCNC: 0.3 MG/DL (ref 0–1.2)
BUN SERPL-MCNC: 6 MG/DL (ref 6–20)
BUN/CREAT SERPL: 7.2 (ref 7–25)
CALCIUM SPEC-SCNC: 9.1 MG/DL (ref 8.6–10.5)
CHLORIDE SERPL-SCNC: 103 MMOL/L (ref 98–107)
CO2 SERPL-SCNC: 28.5 MMOL/L (ref 22–29)
CORTIS SERPL-MCNC: 6.13 MCG/DL
CREAT SERPL-MCNC: 0.83 MG/DL (ref 0.57–1)
EGFRCR SERPLBLD CKD-EPI 2021: 84.9 ML/MIN/1.73
GLOBULIN UR ELPH-MCNC: 2.4 GM/DL
GLUCOSE SERPL-MCNC: 89 MG/DL (ref 65–99)
POTASSIUM SERPL-SCNC: 4.2 MMOL/L (ref 3.5–5.2)
PROT SERPL-MCNC: 6.2 G/DL (ref 6–8.5)
SODIUM SERPL-SCNC: 139 MMOL/L (ref 136–145)
T4 FREE SERPL-MCNC: 0.87 NG/DL (ref 0.93–1.7)
TSH SERPL DL<=0.05 MIU/L-ACNC: 2.6 UIU/ML (ref 0.27–4.2)

## 2022-11-04 PROCEDURE — 84443 ASSAY THYROID STIM HORMONE: CPT

## 2022-11-04 PROCEDURE — 80053 COMPREHEN METABOLIC PANEL: CPT

## 2022-11-04 PROCEDURE — 82533 TOTAL CORTISOL: CPT | Performed by: INTERNAL MEDICINE

## 2022-11-04 PROCEDURE — 82024 ASSAY OF ACTH: CPT

## 2022-11-04 PROCEDURE — 82533 TOTAL CORTISOL: CPT

## 2022-11-04 PROCEDURE — 84439 ASSAY OF FREE THYROXINE: CPT

## 2022-11-04 RX ADMIN — COSYNTROPIN 0.25 MG: 0.25 INJECTION, POWDER, FOR SOLUTION INTRAMUSCULAR; INTRAVENOUS at 10:30

## 2022-11-05 LAB
ACTH PLAS-MCNC: 26.7 PG/ML (ref 7.2–63.3)
CORTIS SERPL-MCNC: 12.3 MCG/DL
CORTIS SERPL-MCNC: 13.9 MCG/DL

## 2022-11-07 ENCOUNTER — TELEPHONE (OUTPATIENT)
Dept: ENDOCRINOLOGY | Facility: CLINIC | Age: 52
End: 2022-11-07

## 2022-11-07 RX ORDER — COSYNTROPIN 0.25 MG/ML
0.25 INJECTION, POWDER, FOR SOLUTION INTRAMUSCULAR; INTRAVENOUS ONCE
Status: COMPLETED | OUTPATIENT
Start: 2022-11-07 | End: 2022-11-04

## 2022-11-07 NOTE — TELEPHONE ENCOUNTER
Pt called checking the status of her labs done on 11/04/22. Pt is aware Dr Aguilar has not reviewed labs yet. Please contact pt @ [phone]

## 2022-11-09 ENCOUNTER — TELEPHONE (OUTPATIENT)
Dept: ENDOCRINOLOGY | Facility: CLINIC | Age: 52
End: 2022-11-09

## 2022-11-09 NOTE — TELEPHONE ENCOUNTER
PT CALLED VERY CONCERNED ABOUT HER LAB RESULTS. SHE REQUESTED A CALL BACK ASA.    I VERIFIED HER PHONE # [phone]

## 2022-11-09 NOTE — TELEPHONE ENCOUNTER
Dr. Aguilar will call pt to discuss lab results. I attempted to call pt but no voicemail available.

## 2022-11-11 NOTE — TELEPHONE ENCOUNTER
I had a long conversation with patient about her lab results. I also had a long extensive discussion with patient's son about her lab results.  Discussed that her labs are not consistent with true overt adrenal insufficiency.  Patient's son described the patient is having episodes of altered mentation since her hospitalization in July 2022.  Of note, the episodes of altered mentation were occurring even when she was on hydrocortisone 10 mg twice daily in August and September 2022.   Reiterated that her endocrine testing did not show any findings of concern and would not be the cause of her current symptoms of altered mentation.   I am having patient return to clinic on Tuesday, December 28 at 9 AM for follow-up evaluation. I plan to repeat her cosyntropin stimulation testing at that time.   Signed: Carlie Aguilar MD

## 2022-11-11 NOTE — TELEPHONE ENCOUNTER
PT SON CALLED REQUESTING TO CONSULT ON THIS PT'S LABS. HE STATED SHE HAS BEEN HAVING SPELLS WHERE SHE IS CONFUSED OR DRUNK LIKE.

## 2022-12-18 RX ORDER — ALPRAZOLAM 1 MG/1
TABLET ORAL EVERY 8 HOURS SCHEDULED
COMMUNITY

## 2022-12-21 ENCOUNTER — APPOINTMENT (OUTPATIENT)
Dept: GENERAL RADIOLOGY | Facility: HOSPITAL | Age: 52
End: 2022-12-21

## 2022-12-21 ENCOUNTER — HOSPITAL ENCOUNTER (EMERGENCY)
Facility: HOSPITAL | Age: 52
Discharge: HOME | End: 2022-12-22
Attending: EMERGENCY MEDICINE | Admitting: EMERGENCY MEDICINE

## 2022-12-21 DIAGNOSIS — R07.89 CHEST WALL PAIN: Primary | ICD-10-CM

## 2022-12-21 LAB
ALBUMIN SERPL-MCNC: 4.6 G/DL (ref 3.5–5.2)
ALBUMIN/GLOB SERPL: 1.5 G/DL
ALP SERPL-CCNC: 68 U/L (ref 39–117)
ALT SERPL W P-5'-P-CCNC: 18 U/L (ref 1–33)
ANION GAP SERPL CALCULATED.3IONS-SCNC: 11.1 MMOL/L (ref 5–15)
AST SERPL-CCNC: 17 U/L (ref 1–32)
BASOPHILS # BLD AUTO: 0.03 10*3/MM3 (ref 0–0.2)
BASOPHILS NFR BLD AUTO: 0.4 % (ref 0–1.5)
BILIRUB SERPL-MCNC: <0.2 MG/DL (ref 0–1.2)
BUN SERPL-MCNC: 11 MG/DL (ref 6–20)
BUN/CREAT SERPL: 11.5 (ref 7–25)
CALCIUM SPEC-SCNC: 9.5 MG/DL (ref 8.6–10.5)
CHLORIDE SERPL-SCNC: 104 MMOL/L (ref 98–107)
CO2 SERPL-SCNC: 25.9 MMOL/L (ref 22–29)
CREAT SERPL-MCNC: 0.96 MG/DL (ref 0.57–1)
D DIMER PPP FEU-MCNC: 0.91 MCGFEU/ML (ref 0–0.52)
DEPRECATED RDW RBC AUTO: 42 FL (ref 37–54)
EGFRCR SERPLBLD CKD-EPI 2021: 71.3 ML/MIN/1.73
EOSINOPHIL # BLD AUTO: 0.12 10*3/MM3 (ref 0–0.4)
EOSINOPHIL NFR BLD AUTO: 1.7 % (ref 0.3–6.2)
ERYTHROCYTE [DISTWIDTH] IN BLOOD BY AUTOMATED COUNT: 13.1 % (ref 12.3–15.4)
GLOBULIN UR ELPH-MCNC: 3 GM/DL
GLUCOSE SERPL-MCNC: 110 MG/DL (ref 65–99)
HCT VFR BLD AUTO: 39.8 % (ref 34–46.6)
HGB BLD-MCNC: 13.4 G/DL (ref 12–15.9)
IMM GRANULOCYTES # BLD AUTO: 0.02 10*3/MM3 (ref 0–0.05)
IMM GRANULOCYTES NFR BLD AUTO: 0.3 % (ref 0–0.5)
LYMPHOCYTES # BLD AUTO: 2.44 10*3/MM3 (ref 0.7–3.1)
LYMPHOCYTES NFR BLD AUTO: 34.5 % (ref 19.6–45.3)
MCH RBC QN AUTO: 29.5 PG (ref 26.6–33)
MCHC RBC AUTO-ENTMCNC: 33.7 G/DL (ref 31.5–35.7)
MCV RBC AUTO: 87.5 FL (ref 79–97)
MONOCYTES # BLD AUTO: 0.42 10*3/MM3 (ref 0.1–0.9)
MONOCYTES NFR BLD AUTO: 5.9 % (ref 5–12)
NEUTROPHILS NFR BLD AUTO: 4.04 10*3/MM3 (ref 1.7–7)
NEUTROPHILS NFR BLD AUTO: 57.2 % (ref 42.7–76)
NRBC BLD AUTO-RTO: 0 /100 WBC (ref 0–0.2)
NT-PROBNP SERPL-MCNC: 55.3 PG/ML (ref 0–900)
PLATELET # BLD AUTO: 271 10*3/MM3 (ref 140–450)
PMV BLD AUTO: 8.6 FL (ref 6–12)
POTASSIUM SERPL-SCNC: 3.9 MMOL/L (ref 3.5–5.2)
PROT SERPL-MCNC: 7.6 G/DL (ref 6–8.5)
RBC # BLD AUTO: 4.55 10*6/MM3 (ref 3.77–5.28)
SODIUM SERPL-SCNC: 141 MMOL/L (ref 136–145)
TROPONIN T SERPL-MCNC: <0.01 NG/ML (ref 0–0.03)
WBC NRBC COR # BLD: 7.07 10*3/MM3 (ref 3.4–10.8)

## 2022-12-21 PROCEDURE — 25010000002 KETOROLAC TROMETHAMINE PER 15 MG: Performed by: EMERGENCY MEDICINE

## 2022-12-21 PROCEDURE — 85025 COMPLETE CBC W/AUTO DIFF WBC: CPT | Performed by: EMERGENCY MEDICINE

## 2022-12-21 PROCEDURE — 71045 X-RAY EXAM CHEST 1 VIEW: CPT

## 2022-12-21 PROCEDURE — 99284 EMERGENCY DEPT VISIT MOD MDM: CPT

## 2022-12-21 PROCEDURE — 80053 COMPREHEN METABOLIC PANEL: CPT | Performed by: EMERGENCY MEDICINE

## 2022-12-21 PROCEDURE — 84484 ASSAY OF TROPONIN QUANT: CPT | Performed by: EMERGENCY MEDICINE

## 2022-12-21 PROCEDURE — 96374 THER/PROPH/DIAG INJ IV PUSH: CPT

## 2022-12-21 PROCEDURE — 85379 FIBRIN DEGRADATION QUANT: CPT | Performed by: EMERGENCY MEDICINE

## 2022-12-21 PROCEDURE — 83880 ASSAY OF NATRIURETIC PEPTIDE: CPT | Performed by: EMERGENCY MEDICINE

## 2022-12-21 PROCEDURE — 36415 COLL VENOUS BLD VENIPUNCTURE: CPT

## 2022-12-21 PROCEDURE — 93005 ELECTROCARDIOGRAM TRACING: CPT | Performed by: EMERGENCY MEDICINE

## 2022-12-21 RX ORDER — SODIUM CHLORIDE 0.9 % (FLUSH) 0.9 %
10 SYRINGE (ML) INJECTION AS NEEDED
Status: DISCONTINUED | OUTPATIENT
Start: 2022-12-21 | End: 2022-12-22 | Stop reason: HOSPADM

## 2022-12-21 RX ORDER — KETOROLAC TROMETHAMINE 30 MG/ML
30 INJECTION, SOLUTION INTRAMUSCULAR; INTRAVENOUS ONCE
Status: COMPLETED | OUTPATIENT
Start: 2022-12-21 | End: 2022-12-21

## 2022-12-21 RX ADMIN — KETOROLAC TROMETHAMINE 30 MG: 30 INJECTION, SOLUTION INTRAMUSCULAR; INTRAVENOUS at 23:56

## 2022-12-22 ENCOUNTER — APPOINTMENT (OUTPATIENT)
Dept: CT IMAGING | Facility: HOSPITAL | Age: 52
End: 2022-12-22

## 2022-12-22 VITALS
HEIGHT: 59 IN | WEIGHT: 150 LBS | RESPIRATION RATE: 18 BRPM | OXYGEN SATURATION: 97 % | HEART RATE: 81 BPM | SYSTOLIC BLOOD PRESSURE: 134 MMHG | TEMPERATURE: 98.3 F | DIASTOLIC BLOOD PRESSURE: 87 MMHG | BODY MASS INDEX: 30.24 KG/M2

## 2022-12-22 LAB
HOLD SPECIMEN: NORMAL
HOLD SPECIMEN: NORMAL
TROPONIN T SERPL-MCNC: <0.01 NG/ML (ref 0–0.03)
WHOLE BLOOD HOLD COAG: NORMAL
WHOLE BLOOD HOLD SPECIMEN: NORMAL

## 2022-12-22 PROCEDURE — 36415 COLL VENOUS BLD VENIPUNCTURE: CPT

## 2022-12-22 PROCEDURE — 25010000002 IOPAMIDOL 61 % SOLUTION: Performed by: EMERGENCY MEDICINE

## 2022-12-22 PROCEDURE — 71275 CT ANGIOGRAPHY CHEST: CPT

## 2022-12-22 PROCEDURE — 84484 ASSAY OF TROPONIN QUANT: CPT | Performed by: EMERGENCY MEDICINE

## 2022-12-22 RX ORDER — CYCLOBENZAPRINE HCL 5 MG
5 TABLET ORAL 3 TIMES DAILY PRN
Qty: 12 TABLET | Refills: 0 | Status: SHIPPED | OUTPATIENT
Start: 2022-12-22

## 2022-12-22 RX ORDER — LIDOCAINE 50 MG/G
1 PATCH TOPICAL EVERY 24 HOURS
Qty: 6 PATCH | Refills: 0 | Status: SHIPPED | OUTPATIENT
Start: 2022-12-22

## 2022-12-22 RX ORDER — KETOROLAC TROMETHAMINE 10 MG/1
10 TABLET, FILM COATED ORAL EVERY 6 HOURS PRN
Qty: 15 TABLET | Refills: 0 | Status: SHIPPED | OUTPATIENT
Start: 2022-12-22

## 2022-12-22 RX ADMIN — IOPAMIDOL 100 ML: 612 INJECTION, SOLUTION INTRAVENOUS at 00:24

## 2022-12-22 NOTE — ED PROVIDER NOTES
Subjective   History of Present Illness  51-year-old female presents to the ED with a chief complaint of rib pain.  Patient is complaining of left-sided rib and chest wall pain that started yesterday.  She states that it got slightly worse today.  Pain is a dull ache in her lateral left chest.  She states that the pain is worse with movement and deep breathing.  Pain is constant and aching.  The pain does not radiate.  Pain is an 8 out of 10.  Pain is not associated with any shortness of breath.  She has no cough or wheeze.  She denies any infectious symptoms including fever or chills.  No lightheadedness or dizziness.  No shortness of breath.  No nausea vomiting diarrhea or abdominal pain.  No complaints this time.        Review of Systems   Constitutional: Negative for fever.   Respiratory: Negative for cough, chest tightness, shortness of breath and wheezing.    Cardiovascular: Positive for chest pain. Negative for palpitations and leg swelling.   All other systems reviewed and are negative.      Past Medical History:   Diagnosis Date   • Anxiety    • Arthritis    • Depression    • GERD (gastroesophageal reflux disease)    • Hyperlipemia    • Hypertension    • IBS (irritable bowel syndrome)    • Kidney disease    • Kidney stone    • Menometrorrhagia 2011   • Osteoporosis    • Reflux esophagitis    • Urinary tract infection        Allergies   Allergen Reactions   • Seroquel [Quetiapine Fumarate] Anaphylaxis   • Trazodone And Nefazodone Anaphylaxis   • Wasp Venom Anaphylaxis   • Atorvastatin Myalgia       Past Surgical History:   Procedure Laterality Date   • CHOLECYSTECTOMY     • COLONOSCOPY N/A 6/7/2021    Procedure: COLONOSCOPY;  Surgeon: Anna Vann MD;  Location: [facility] ENDOSCOPY;  Service: Gastroenterology;  Laterality: N/A;   • D & C HYSTEROSCOPY ENDOMETRIAL ABLATION  12/29/2011    NOVASURE ABLATION (DR MILLER)   • GALLBLADDER SURGERY     • TUBAL ABDOMINAL LIGATION     • WISDOM TOOTH EXTRACTION          Family History   Problem Relation Age of Onset   • Kidney disease Mother    • Diabetes Mother    • Hypertension Mother    • Hypertension Father    • Kidney disease Father    • Bone cancer Maternal Aunt    • Lung cancer Maternal Aunt    • Coronary artery disease Paternal Uncle    • Heart attack Paternal Uncle    • Osteoporosis Paternal Grandmother    • Breast cancer Neg Hx        Social History     Socioeconomic History   • Marital status:    Tobacco Use   • Smoking status: Former   • Smokeless tobacco: Never   Vaping Use   • Vaping Use: Never used   Substance and Sexual Activity   • Alcohol use: Yes     Comment: socially   • Drug use: Never   • Sexual activity: Yes     Partners: Male           Objective   Physical Exam  Vitals and nursing note reviewed.   Constitutional:       General: She is not in acute distress.     Appearance: She is well-developed. She is not diaphoretic.   HENT:      Head: Normocephalic and atraumatic.      Nose: Nose normal.   Eyes:      Conjunctiva/sclera: Conjunctivae normal.   Cardiovascular:      Rate and Rhythm: Normal rate and regular rhythm.      Pulses: Normal pulses.   Pulmonary:      Effort: Pulmonary effort is normal. No respiratory distress.      Breath sounds: Normal breath sounds.      Comments: Tenderness palpation to the left lateral chest wall  Chest:      Chest wall: Tenderness present.   Abdominal:      General: There is no distension.      Palpations: Abdomen is soft.      Tenderness: There is no abdominal tenderness. There is no guarding.   Musculoskeletal:         General: No deformity.   Neurological:      Mental Status: She is alert and oriented to person, place, and time.      Cranial Nerves: No cranial nerve deficit.         Procedures           ED Course  ED Course as of 12/22/22 0132   Wed Dec 21, 2022   2258 EKG interpreted by me.  Sinus rhythm.  Tachycard normal rate.   Rate of 86.  Some artifact present but no obvious ST or T wave changes.   Otherwise normal EKG [CG]      ED Course User Index  [CG] Leo Escobedo, DO                                           MDM  51-year-old female presents to the ED with reproducible left-sided chest wall pain.  Initial EKG is normal rate with no obvious ST or T wave changes.  Initial pulse ox is 99% on room air.  Breath sounds are clear and equal bilaterally.  She denies infectious symptoms including fever chills cough or wheeze.  The pain is reproducible and appears to be pinpoint.  Initial troponin is negative.  Low suspicion for ACS given negative work-up today but her D-dimer was elevated so we will obtain a CT PE to rule out pulmonary embolism or other acute pulmonary abnormality.  Did obtain a repeat troponin which was also negative.  CT PE was negative for acute pulmonary embolism or acute pathology.  Patient was resting comfortably on reevaluation.  Heart score of 2.  Exam more consistent with a musculoskeletal chest wall strain.  Patient is appropriate for discharge to follow-up outpatient as needed she is agreeable to this plan.    HEART Score for Major Cardiac Events - MDCalc  Calculated on Dec 22 2022 1:31 AM  2 points -> Low Score (0-3 points) Risk of MACE of 0.9-1.7%.  Lab Results (last 24 hours)     Procedure Component Value Units Date/Time    CBC & Differential [[id number]]  (Normal) Collected: 12/21/22 2249    Specimen: Blood Updated: 12/21/22 2256    Narrative:      The following orders were created for panel order CBC & Differential.  Procedure                               Abnormality         Status                     ---------                               -----------         ------                     CBC Auto Differential[[id number]]        Normal              Final result                 Please view results for these tests on the individual orders.    Comprehensive Metabolic Panel [[id number]]  (Abnormal) Collected: 12/21/22 2249    Specimen: Blood Updated: 12/21/22 2314     Glucose 110 mg/dL       BUN 11 mg/dL      Creatinine 0.96 mg/dL      Sodium 141 mmol/L      Potassium 3.9 mmol/L      Chloride 104 mmol/L      CO2 25.9 mmol/L      Calcium 9.5 mg/dL      Total Protein 7.6 g/dL      Albumin 4.60 g/dL      ALT (SGPT) 18 U/L      AST (SGOT) 17 U/L      Alkaline Phosphatase 68 U/L      Total Bilirubin <0.2 mg/dL      Globulin 3.0 gm/dL      A/G Ratio 1.5 g/dL      BUN/Creatinine Ratio 11.5     Anion Gap 11.1 mmol/L      eGFR 71.3 mL/min/1.73      Comment: National Kidney Foundation and American Society of Nephrology (ASN) Task Force recommended calculation based on the Chronic Kidney Disease Epidemiology Collaboration (CKD-EPI) equation refit without adjustment for race.       Narrative:      GFR Normal >60  Chronic Kidney Disease <60  Kidney Failure <15      BNP [[id number]]  (Normal) Collected: 12/21/22 2249    Specimen: Blood Updated: 12/21/22 2316     proBNP 55.3 pg/mL     Narrative:      Among patients with dyspnea, NT-proBNP is highly sensitive for the detection of acute congestive heart failure. In addition NT-proBNP of <300 pg/ml effectively rules out acute congestive heart failure with 99% negative predictive value.    Results may be falsely decreased if patient taking Biotin.      Troponin [[id number]]  (Normal) Collected: 12/21/22 2249    Specimen: Blood Updated: 12/21/22 2316     Troponin T <0.010 ng/mL     Narrative:      Troponin T Reference Range:  <= 0.03 ng/mL-   Negative for AMI  >0.03 ng/mL-     Abnormal for myocardial necrosis.  Clinicians would have to utilize clinical acumen, EKG, Troponin and serial changes to determine if it is an Acute Myocardial Infarction or myocardial injury due to an underlying chronic condition.       Results may be falsely decreased if patient taking Biotin.      CBC Auto Differential [[id number]]  (Normal) Collected: 12/21/22 2249    Specimen: Blood Updated: 12/21/22 2256     WBC 7.07 10*3/mm3      RBC 4.55 10*6/mm3      Hemoglobin 13.4 g/dL      Hematocrit  "39.8 %      MCV 87.5 fL      MCH 29.5 pg      MCHC 33.7 g/dL      RDW 13.1 %      RDW-SD 42.0 fl      MPV 8.6 fL      Platelets 271 10*3/mm3      Neutrophil % 57.2 %      Lymphocyte % 34.5 %      Monocyte % 5.9 %      Eosinophil % 1.7 %      Basophil % 0.4 %      Immature Grans % 0.3 %      Neutrophils, Absolute 4.04 10*3/mm3      Lymphocytes, Absolute 2.44 10*3/mm3      Monocytes, Absolute 0.42 10*3/mm3      Eosinophils, Absolute 0.12 10*3/mm3      Basophils, Absolute 0.03 10*3/mm3      Immature Grans, Absolute 0.02 10*3/mm3      nRBC 0.0 /100 WBC     D-dimer, Quantitative [[id number]]  (Abnormal) Collected: 12/21/22 2249    Specimen: Blood Updated: 12/21/22 2331     D-Dimer, Quantitative 0.91 MCGFEU/mL     Narrative:      According to the assay 's published package insert, a normal (<0.50 MCGFEU/mL) D-dimer result in conjunction with a non-high clinical probability assessment, excludes deep vein thrombosis (DVT) and pulmonary embolism (PE) with high sensitivity.    D-dimer values increase with age and this can make VTE exclusion of an older population difficult. To address this, the American College of Physicians, based on best available evidence and recent guidelines, recommends that clinicians use age-adjusted D-dimer thresholds in patients greater than 50 years of age with: a) a low probability of PE who do not meet all Pulmonary Embolism Rule Out Criteria, or b) in those with intermediate probability of PE.   The formula for an age-adjusted D-dimer cut-off is \"age/100\".  For example, a 60 year old patient would have an age-adjusted cut-off of 0.60 MCGFEU/mL and an 80 year old 0.80 MCGFEU/mL.    Troponin [[id number]]  (Normal) Collected: 12/22/22 0102    Specimen: Blood Updated: 12/22/22 0123     Troponin T <0.010 ng/mL     Narrative:      Troponin T Reference Range:  <= 0.03 ng/mL-   Negative for AMI  >0.03 ng/mL-     Abnormal for myocardial necrosis.  Clinicians would have to utilize clinical " acumen, EKG, Troponin and serial changes to determine if it is an Acute Myocardial Infarction or myocardial injury due to an underlying chronic condition.       Results may be falsely decreased if patient taking Biotin.            CT Angiogram Chest Pulmonary Embolism    Result Date: 12/22/2022  FINAL REPORT TECHNIQUE: null CLINICAL HISTORY: Pulmonary embolism (PE) suspected, positive D-dimer COMPARISON: null FINDINGS: CT angiography chest with contrast. 3D Postprocessing. Comparison: None Findings: Vascular: No pulmonary embolism. No thoracic aortic aneurysm. Mediastinum: No cardiomegaly. Lymph nodes: No adenopathy. Lungs: No focal infiltrates or masses. No edema. Pleura: No pneumothorax or effusion. Upper abdomen: No acute pathology. Bones: No acute fracture or dislocation. Degenerative changes.     Impression: IMPRESSION: No pulmonary emboli. No acute pathology. Authenticated and Electronically Signed by Lalit Louie MD on 12/22/2022 01:12:19 AM        Final diagnoses:   Chest wall pain       ED Disposition  ED Disposition     ED Disposition   Discharge    Condition   Stable    Comment   --             Aimee Canela MD  [address] [facility] [address] [phone]               Medication List      New Prescriptions    cyclobenzaprine 5 MG tablet  Commonly known as: FLEXERIL  Take 1 tablet by mouth 3 (Three) Times a Day As Needed for Muscle Spasms.     ketorolac 10 MG tablet  Commonly known as: TORADOL  Take 1 tablet by mouth Every 6 (Six) Hours As Needed for Moderate Pain.     lidocaine 5 %  Commonly known as: LIDODERM  Place 1 patch on the skin as directed by provider Daily. Remove & Discard patch within 12 hours or as directed by MD           Where to Get Your Medications      These medications were sent to [facility] [id number] - [place], [place] - [address] [place], [place] [address] - [phone]  - [phone]   [address] [place] [id number]    Phone: [phone]    · cyclobenzaprine 5 MG tablet  · ketorolac 10 MG tablet  · lidocaine 5 %          Leo Escobedo, DO  12/22/22 0561

## 2023-02-22 ENCOUNTER — HOSPITAL ENCOUNTER (OUTPATIENT)
Dept: MAMMOGRAPHY | Facility: HOSPITAL | Age: 53
Discharge: HOME | End: 2023-02-22
Admitting: INTERNAL MEDICINE
Payer: COMMERCIAL

## 2023-02-22 DIAGNOSIS — Z12.31 VISIT FOR SCREENING MAMMOGRAM: ICD-10-CM

## 2023-02-22 PROCEDURE — 77063 BREAST TOMOSYNTHESIS BI: CPT

## 2023-02-22 PROCEDURE — 77067 SCR MAMMO BI INCL CAD: CPT

## (undated) DEVICE — GLV SURG SENSICARE W/ALOE PF LF 7.5 STRL

## (undated) DEVICE — VLV SXN AIR/H2O ORCAPOD3 1P/U STRL

## (undated) DEVICE — LUBE JELLY PK/2.75GM STRL BX/144

## (undated) DEVICE — MEDI-VAC NON-CONDUCTIVE SUCTION TUBING: Brand: CARDINAL HEALTH

## (undated) DEVICE — Device

## (undated) DEVICE — ENDOSCOPY PORT CONNECTOR FOR OLYMPUS® SCOPES: Brand: ERBE

## (undated) DEVICE — HYBRID TUBING/CAP SET FOR OLYMPUS® SCOPES: Brand: ERBE

## (undated) DEVICE — SYR LUER SLPTP 50ML